# Patient Record
Sex: MALE | Race: WHITE
[De-identification: names, ages, dates, MRNs, and addresses within clinical notes are randomized per-mention and may not be internally consistent; named-entity substitution may affect disease eponyms.]

---

## 2017-05-22 NOTE — PCM.HP
H&P History of Present Illness





- General


Date of Service: 05/22/17


Admit Problem/Dx: 


 Admission Diagnosis/Problem





Admission Diagnosis/Problem      Afib, Atrial fibrillation








Source of Information: Patient, Provider


History Limitations: Reports: No Limitations





- History of Present Illness


Initial Comments - Free Text/Narative: 





Caden presents to the emergency room today with proximally 2 weeks of 

progressive fatigue, shortness of breath with exertion and fevers at home.  He 

reports onset of symptoms on May 6 with initial complaints including sinus 

congestion and sinus drainage.  Over the next couple of days this improved but 

his energy level dropped significantly.  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 

as a farmer but had significant fatigue throughout the day and was exhausted by 

the end of today.  He reports some intermittent "twinges" in his left chest but 

does not say that he has pain.  He thought maybe he had just pushed too hard 

with his farm tools.  He reports moderate achy pain in his thighs for the past 

few days but thinks this was because he overdid it using the skin steer.  He 

has had fevers including drenching sweats at home over the past few days.  He 

did have a tick bite approximately 2 weeks ago.  He does not recall having much 

attention to the take to see if it was a deer tick or not.  Appetite has been 

normal.  No diarrhea but he has had some constipation.





Workup in the emergency room remarkable for atrial fibrillation with rapid 

ventricular response and heart rates in the 150-190 range.  These are improving 

with a diltiazem infusion.  He has acute kidney injury, elevated AST and ALT 

and leukocytosis in addition to a CK level of nearly 3500.  There is concern 

for tickborne illness leading to the above abnormal labs and symptoms.  With 

his atrial fibrillation he will be admitted for management.





- Related Data


Allergies/Adverse Reactions: 


 Allergies











Allergy/AdvReac Type Severity Reaction Status Date / Time


 


No Known Allergies Allergy   Verified 05/22/17 14:19











Home Medications: 


 Home Meds





NK [No Known Home Meds]  05/22/17 [History]











Past Medical History


Respiratory History: Reports: Bronchitis, Recurrent





- Infectious Disease History


Infectious Disease History: Reports: Chicken Pox, Measles, Mumps





Social & Family History





- Family History


Cardiac: Reports: Afib, Arrhythmia, MI





- Tobacco Use


Smoking Status *Q: Former Smoker


Used Tobacco, but Quit: Yes


Month Tobacco Last Used: 1


Second Hand Smoke Exposure: No





- Caffeine Use


Caffeine Use: Reports: Coffee, Soda





- Alcohol Use


Alcohol Use History: Yes


Days Per Week of Alcohol Use: 1


Number of Drinks Per Day: 1


Total Drinks Per Week: 1





- Recreational Drug Use


Recreational Drug Use: No





H&P Review of Systems





- Review of Systems:


Review Of Systems: See Below


Free Text/Narrative: 





A complete 12 point review of systems was obtained.  Pertinent positives and 

negatives are noted in the history of present illness.  All other systems were 

reviewed and were negative except as noted.





Exam





- Exam


Exam: See Below





- Vital Signs


Vital Signs: 


 Last Vital Signs











Temp  36.4 C   05/22/17 16:46


 


Pulse  111 H  05/22/17 16:46


 


Resp  18   05/22/17 16:46


 


BP  130/77   05/22/17 16:46


 


Pulse Ox  91 L  05/22/17 16:46











Weight: 79.379 kg





- Exam


Quality Assessment: No: Supplemental Oxygen, Urinary Catheter


General: Alert, Oriented, Cooperative.  No: Mild Distress


HEENT: Conjunctiva Clear, Mucosa Moist & Pink.  No: Scleral Icterus


Neck: Supple, Trachea Midline.  No: Lymphadenopathy, Thyromegaly


Lungs: Normal Respiratory Effort, Rales (few right lung base)


Cardiovascular: Irregular Rhythm, Tachycardia.  No: Systolic Murmur


Abdomen: Normal Bowel Sounds, Soft.  No: Distention, Tenderness


Back Exam: Normal Inspection, Full Range of Motion


Extremities: Normal Inspection, Normal Pulses, Edema (very mild ankle edema 

bilaterally )


Skin: Warm, Dry, Intact


Neuro Extensive - Mental Status: Alert, Oriented x3


Neuro Extensive - Motor, Sensory, Reflexes: CN II-XII Intact.  No: Dysarthria, 

Abnormal Motor, Tremor


Psychiatric: Alert, Normal Affect





- Patient Data


Lab Results last 24 hrs: 


 Laboratory Results - last 24 hr











  05/22/17 05/22/17 05/22/17 Range/Units





  14:21 14:21 14:21 


 


WBC  12.1 H    (4.5-11.0)  K/uL


 


RBC  4.78    (4.30-5.90)  M/uL


 


Hgb  14.9    (12.0-15.0)  g/dL


 


Hct  43.4    (40.0-54.0)  %


 


MCV  91    (80-98)  fL


 


MCH  31    (27-31)  pg


 


MCHC  34    (32-36)  %


 


Plt Count  182    (150-400)  K/uL


 


Neut % (Auto)  69 H    (36-66)  %


 


Lymph % (Auto)  21 L    (24-44)  %


 


Mono % (Auto)  9 H    (2-6)  %


 


Eos % (Auto)  0 L    (2-4)  %


 


Baso % (Auto)  0    (0-1)  %


 


Sodium   141   (140-148)  mmol/L


 


Potassium   4.0   (3.6-5.2)  mmol/L


 


Chloride   104   (100-108)  mmol/L


 


Carbon Dioxide   24   (21-32)  mmol/L


 


Anion Gap   12.8   (5.0-14.0)  mmol/L


 


BUN   29 H   (7-18)  mg/dL


 


Creatinine   1.5 H   (0.8-1.3)  mg/dL


 


Est Cr Clr Drug Dosing   53.86   mL/min


 


Estimated GFR (MDRD)   49 L   (>60)  


 


Glucose   130 H   ()  mg/dL


 


Calcium   8.3 L   (8.5-10.1)  mg/dL


 


Magnesium     (1.8-2.4)  mg/dL


 


Total Bilirubin   0.7   (0.2-1.0)  mg/dL


 


AST   137 H   (15-37)  U/L


 


ALT   159 H   (12-78)  U/L


 


Alkaline Phosphatase   76   ()  U/L


 


Creatine Kinase   3851 H   ()  U/L


 


CK-MB (CK-2)     (0-3.6)  mg/mL


 


Troponin I    < 0.017  (0.000-0.056)  ng/mL


 


C-Reactive Protein     (0.0-0.3)  mg/dL


 


Total Protein   6.4   (6.4-8.2)  g/dL


 


Albumin   3.7   (3.4-5.0)  g/dL


 


Globulin   2.7   (2.3-3.5)  g/dL


 


Albumin/Globulin Ratio   1.4   (1.2-2.2)  














  05/22/17 05/22/17 05/22/17 Range/Units





  14:21 14:21 15:51 


 


WBC     (4.5-11.0)  K/uL


 


RBC     (4.30-5.90)  M/uL


 


Hgb     (12.0-15.0)  g/dL


 


Hct     (40.0-54.0)  %


 


MCV     (80-98)  fL


 


MCH     (27-31)  pg


 


MCHC     (32-36)  %


 


Plt Count     (150-400)  K/uL


 


Neut % (Auto)     (36-66)  %


 


Lymph % (Auto)     (24-44)  %


 


Mono % (Auto)     (2-6)  %


 


Eos % (Auto)     (2-4)  %


 


Baso % (Auto)     (0-1)  %


 


Sodium     (140-148)  mmol/L


 


Potassium     (3.6-5.2)  mmol/L


 


Chloride     (100-108)  mmol/L


 


Carbon Dioxide     (21-32)  mmol/L


 


Anion Gap     (5.0-14.0)  mmol/L


 


BUN     (7-18)  mg/dL


 


Creatinine     (0.8-1.3)  mg/dL


 


Est Cr Clr Drug Dosing     mL/min


 


Estimated GFR (MDRD)     (>60)  


 


Glucose     ()  mg/dL


 


Calcium     (8.5-10.1)  mg/dL


 


Magnesium  2.1    (1.8-2.4)  mg/dL


 


Total Bilirubin     (0.2-1.0)  mg/dL


 


AST     (15-37)  U/L


 


ALT     (12-78)  U/L


 


Alkaline Phosphatase     ()  U/L


 


Creatine Kinase     ()  U/L


 


CK-MB (CK-2)    97.4 H*  (0-3.6)  mg/mL


 


Troponin I     (0.000-0.056)  ng/mL


 


C-Reactive Protein   0.85 H   (0.0-0.3)  mg/dL


 


Total Protein     (6.4-8.2)  g/dL


 


Albumin     (3.4-5.0)  g/dL


 


Globulin     (2.3-3.5)  g/dL


 


Albumin/Globulin Ratio     (1.2-2.2)  











Result Diagrams: 


 05/22/17 14:21





 05/22/17 14:21





EKG INTERPRETATION


EKG Date: 05/22/17


Rhythm: a-fib


Rate (beats/min): 170


Axis: normal


P-wave: variable


QRS: normal


ST-T: normal


QT: normal


Comparison: NA - no prior EKG





*Q Meaningful Use (ADM)





- VTE *Q


VTE Criteria *Q: 








- VTE Risk Assess *Q


Each Risk Factor Represents 1 Point: Age 41 - 59 years


Total Score 1 Point Risk Factors: 1





- Stroke *Q


Stroke Criteria *Q: 








- AMI *Q


AMI Criteria *Q: 








- Problem List


(1) Atrial fibrillation with rapid ventricular response


SNOMED Code(s): 960973377416867


   ICD Code: I48.91 - UNSPECIFIED ATRIAL FIBRILLATION   Status: Acute   Current 

Visit: Yes   





(2) Acute kidney injury


SNOMED Code(s): 24650171


   ICD Code: N17.9 - ACUTE KIDNEY FAILURE, UNSPECIFIED   Status: Acute   

Current Visit: Yes   





(3) Anaplasmosis


SNOMED Code(s): 991898944


   ICD Code: A77.49 - OTHER EHRLICHIOSIS   Status: Acute   Current Visit: Yes   


Problem List Initiated/Reviewed/Updated: Yes


Orders Last 24hrs: 


 Active Orders 24 hr











 Category Date Time Status


 


 EKG Documentation Completion [RC] ASDIRECTED Care  05/22/17 14:07 Inactive


 


 BABESIA MICROTI IGG AND IGM [REF] Stat Lab  05/22/17 14:21 Received


 


 EHRLICHIA CHAFFEENSIS, IGG&IGM [REF] Stat Lab  05/22/17 14:21 Received


 


 LYME AB SCREEN RFLX [REF] Stat Lab  05/22/17 14:21 Received


 


 TSH ULTRASENSITIVE [CHEM] Routine Lab  05/22/17 16:40 Ordered


 


 UA W/MICROSCOPIC [URIN] Urgent Lab  05/22/17 14:06 Uncollected


 


 Diltiazem [Cardizem] 100 mg Med  05/22/17 14:30 Active





 Sodium Chloride 0.9% [Normal Saline] 100 ml   





 IV TITRATE   


 


 Sodium Chloride 0.9% [Normal Saline] 1,000 ml Med  05/22/17 14:15 Active





 IV ASDIRECTED   


 


 Resuscitation Status Routine Resus Stat  05/22/17 16:41 Ordered








 Medication Orders





Sodium Chloride (Normal Saline)  1,000 mls @ 500 mls/hr IV ASDIRECTED CHRIS


   Last Admin: 05/22/17 14:26  Dose: 500 mls/hr


Diltiazem HCl 100 mg/ Sodium (Chloride)  100 mls @ 5 mls/hr IV TITRATE CHRIS; 5 MG

/HR


   PRN Reason: Protocol


   Last Titration: 05/22/17 14:58  Dose: 15 mg/hr, 15 mls/hr


   Admin: 05/22/17 14:30  Dose: 5 mg/hr, 5 mls/hr








Assessment/Plan Comment:: 





Assessment and plan - 





Atrial fibrillation with rapid ventricular response - no history of heart 

disease.  Suspect inflammatory cause and possibly myocarditis with probable 

tickborne illness.  CK level elevated but troponin level normal.  Patient is 

not symptomatic with the atrial fibrillation side not sure how long he has been 

in the rhythm.  Cardioversion contraindicated because of this.  His 

electrolytes are acceptable.  Thyroid studies are pending.  He seems to be 

improving with the diltiazem infusion.  Hopefully treating the infection we'll 

provide significant benefit for the heart is well.


-Continue diltiazem infusion


-Followup thyroid studies


-Consider low-dose beta blocker versus potentially amiodarone if diltiazem isn'

t effective


-Treat infection as below





Acute kidney injury - probably secondary to acute infection and for hydration.


-IV fluids


-Labs in the morning





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  Tick stuck 

at least 36 hours and possibly more than 48 hours.  Laboratory studies but 

other than the elevated white blood cell count.  He has received doxycycline in 

the emergency room.  Is not currently febrile.  Other obvious source of 

infection.


-Doxycycline


-Followup tick studies which are send out labs


-Repeat labs in the morning





Maintenance issues - 


- DVT prophylaxis - SCDs


- GI prophylaxis - not indicated


- Nutrition - regular diet


- Schafer catheter - not indicated





CODE STATUS - full code





Admission justification - patient will be referred observation status for 

management of paroxysmal atrial fibrillation with rapid ventricular response





Disposition - anticipate discharge home tomorrow





Primary care physician - none currently





Horacio Clayton M.D.

## 2017-05-22 NOTE — EDM.PDOC
ED HPI GENERAL MEDICAL PROBLEM





- General


Chief Complaint: Chest Pain


Stated Complaint: FROM CLINIC CHEST PAIN


Time Seen by Provider: 05/22/17 15:14


Source of Information: Reports: Patient, Family


History Limitations: Reports: No Limitations





- History of Present Illness


INITIAL COMMENTS - FREE TEXT/NARRATIVE: 





Pt went to the walk in clinic today and was found to have a heart rate of 190. 

He has had slight chest  pressure. He has very poor exercise tolerance. He did 

have a tick bite several days ago and he thinks it could have been on about 2 

days. He has been having fevers. he had marked chilling .  He did not check his 

temp. 


Onset: Gradual


Duration: Day(s):, Other (pt has been very weak anf tired. )


Location: Reports: Chest, Other (pt has chest pressure. )


Associated Symptoms: Reports: Diaphoresis, Fever/Chills, Weakness





- Related Data


 Allergies











Allergy/AdvReac Type Severity Reaction Status Date / Time


 


No Known Allergies Allergy   Verified 05/22/17 14:19











Home Meds: 


 Home Meds





NK [No Known Home Meds]  05/22/17 [History]











Past Medical History


Respiratory History: Reports: Bronchitis, Recurrent





- Infectious Disease History


Infectious Disease History: Reports: Chicken Pox, Measles, Mumps





Social & Family History





- Family History


Cardiac: Reports: Afib, Arrhythmia, MI





- Tobacco Use


Smoking Status *Q: Former Smoker


Used Tobacco, but Quit: Yes


Month Tobacco Last Used: 1


Second Hand Smoke Exposure: No





- Caffeine Use


Caffeine Use: Reports: Coffee, Soda





- Recreational Drug Use


Recreational Drug Use: No





ED ROS GENERAL





- Review of Systems


Review Of Systems: See Below


Constitutional: Reports: Fever, Chills, Other (pt has shaking chills when he 

gets out of the shower)


HEENT: Reports: No Symptoms


Respiratory: Reports: No Symptoms


Cardiovascular: Reports: Palpitations, Other ( chest pressure)


Endocrine: Reports: Fatigue


GI/Abdominal: Reports: No Symptoms


: Reports: No Symptoms


Musculoskeletal: Reports: No Symptoms


Skin: Reports: No Symptoms


Neurological: Reports: No Symptoms


Psychiatric: Reports: Anxiety





ED EXAM, GENERAL





- Physical Exam


Exam: See Below


Free Text/Narrative:: 





pt arrived with a heart rate of 180 in atrial fib.  He hs been very fatiqued 

and not feeling well


Exam Limited By: No Limitations


General Appearance: Alert, Anxious, Mild Distress


Ears: Normal TMs


Nose: Normal Inspection


Throat/Mouth: Normal Inspection


Head: Atraumatic


Neck: Normal Inspection


Respiratory/Chest: No Respiratory Distress


Cardiovascular: Tachycardia, Irregularly Irregular, Other ( rate of 180)


GI/Abdominal: Soft, Non-Tender


 (Male) Exam: Deferred


Rectal (Males) Exam: Deferred


Extremities: Normal Inspection


Neurological: Alert, Oriented, Normal Cognition


Psychiatric: Anxious





Course





- Vital Signs


Last Recorded V/S: 


 Last Vital Signs











Temp  36.8 C   05/22/17 14:42


 


Pulse  148 H  05/22/17 15:23


 


Resp  16   05/22/17 15:23


 


BP  164/51 H  05/22/17 15:23


 


Pulse Ox  95   05/22/17 15:23














- Orders/Labs/Meds


Orders: 


 Active Orders 24 hr











 Category Date Time Status


 


 EKG Documentation Completion [RC] ASDIRECTED Care  05/22/17 14:07 Inactive


 


 BABESIA MICROTI IGG AND IGM [REF] Stat Lab  05/22/17 14:21 Received


 


 CKMB [CHEM] Stat Lab  05/22/17 15:48 Ordered


 


 EHRLICHIA CHAFFEENSIS, IGG&IGM [REF] Stat Lab  05/22/17 14:21 Received


 


 LYME AB SCREEN RFLX [REF] Stat Lab  05/22/17 14:21 Received


 


 UA W/MICROSCOPIC [URIN] Urgent Lab  05/22/17 14:06 Uncollected


 


 Diltiazem [Cardizem] 100 mg Med  05/22/17 14:30 Active





 Sodium Chloride 0.9% [Normal Saline] 100 ml   





 IV TITRATE   


 


 Doxycycline [Vibramycin] 100 mg Med  05/22/17 15:04 Active





 Sodium Chloride 0.9% [Normal Saline] 100 ml   





 IV ONETIME   


 


 Sodium Chloride 0.9% [Normal Saline] 1,000 ml Med  05/22/17 14:15 Active





 IV ASDIRECTED   








 Medication Orders





Sodium Chloride (Normal Saline)  1,000 mls @ 500 mls/hr IV ASDIRECTED CHRIS


   Last Admin: 05/22/17 14:26  Dose: 500 mls/hr


Diltiazem HCl 100 mg/ Sodium (Chloride)  100 mls @ 5 mls/hr IV TITRATE CHRIS; 5 MG

/HR


   PRN Reason: Protocol


   Last Titration: 05/22/17 14:58  Dose: 15 mg/hr, 15 mls/hr


   Admin: 05/22/17 14:30  Dose: 5 mg/hr, 5 mls/hr


Doxycycline Hyclate 100 mg/ (Sodium Chloride)  100 mls @ 100 mls/hr IV ONETIME 

ONE


   Stop: 05/22/17 16:03


   Last Admin: 05/22/17 15:30  Dose: 100 mls/hr


   Admin: 05/22/17 15:30  Dose: 100 mls/hr








Labs: 


 Laboratory Tests











  05/22/17 05/22/17 05/22/17 Range/Units





  14:21 14:21 14:21 


 


WBC  12.1 H    (4.5-11.0)  K/uL


 


RBC  4.78    (4.30-5.90)  M/uL


 


Hgb  14.9    (12.0-15.0)  g/dL


 


Hct  43.4    (40.0-54.0)  %


 


MCV  91    (80-98)  fL


 


MCH  31    (27-31)  pg


 


MCHC  34    (32-36)  %


 


Plt Count  182    (150-400)  K/uL


 


Neut % (Auto)  69 H    (36-66)  %


 


Lymph % (Auto)  21 L    (24-44)  %


 


Mono % (Auto)  9 H    (2-6)  %


 


Eos % (Auto)  0 L    (2-4)  %


 


Baso % (Auto)  0    (0-1)  %


 


Sodium   141   (140-148)  mmol/L


 


Potassium   4.0   (3.6-5.2)  mmol/L


 


Chloride   104   (100-108)  mmol/L


 


Carbon Dioxide   24   (21-32)  mmol/L


 


Anion Gap   12.8   (5.0-14.0)  mmol/L


 


BUN   29 H   (7-18)  mg/dL


 


Creatinine   1.5 H   (0.8-1.3)  mg/dL


 


Est Cr Clr Drug Dosing   53.86   mL/min


 


Estimated GFR (MDRD)   49 L   (>60)  


 


Glucose   130 H   ()  mg/dL


 


Calcium   8.3 L   (8.5-10.1)  mg/dL


 


Magnesium     (1.8-2.4)  mg/dL


 


Total Bilirubin   0.7   (0.2-1.0)  mg/dL


 


AST   137 H   (15-37)  U/L


 


ALT   159 H   (12-78)  U/L


 


Alkaline Phosphatase   76   ()  U/L


 


Creatine Kinase   3851 H   ()  U/L


 


Troponin I    < 0.017  (0.000-0.056)  ng/mL


 


C-Reactive Protein     (0.0-0.3)  mg/dL


 


Total Protein   6.4   (6.4-8.2)  g/dL


 


Albumin   3.7   (3.4-5.0)  g/dL


 


Globulin   2.7   (2.3-3.5)  g/dL


 


Albumin/Globulin Ratio   1.4   (1.2-2.2)  














  05/22/17 05/22/17 Range/Units





  14:21 14:21 


 


WBC    (4.5-11.0)  K/uL


 


RBC    (4.30-5.90)  M/uL


 


Hgb    (12.0-15.0)  g/dL


 


Hct    (40.0-54.0)  %


 


MCV    (80-98)  fL


 


MCH    (27-31)  pg


 


MCHC    (32-36)  %


 


Plt Count    (150-400)  K/uL


 


Neut % (Auto)    (36-66)  %


 


Lymph % (Auto)    (24-44)  %


 


Mono % (Auto)    (2-6)  %


 


Eos % (Auto)    (2-4)  %


 


Baso % (Auto)    (0-1)  %


 


Sodium    (140-148)  mmol/L


 


Potassium    (3.6-5.2)  mmol/L


 


Chloride    (100-108)  mmol/L


 


Carbon Dioxide    (21-32)  mmol/L


 


Anion Gap    (5.0-14.0)  mmol/L


 


BUN    (7-18)  mg/dL


 


Creatinine    (0.8-1.3)  mg/dL


 


Est Cr Clr Drug Dosing    mL/min


 


Estimated GFR (MDRD)    (>60)  


 


Glucose    ()  mg/dL


 


Calcium    (8.5-10.1)  mg/dL


 


Magnesium  2.1   (1.8-2.4)  mg/dL


 


Total Bilirubin    (0.2-1.0)  mg/dL


 


AST    (15-37)  U/L


 


ALT    (12-78)  U/L


 


Alkaline Phosphatase    ()  U/L


 


Creatine Kinase    ()  U/L


 


Troponin I    (0.000-0.056)  ng/mL


 


C-Reactive Protein   0.85 H  (0.0-0.3)  mg/dL


 


Total Protein    (6.4-8.2)  g/dL


 


Albumin    (3.4-5.0)  g/dL


 


Globulin    (2.3-3.5)  g/dL


 


Albumin/Globulin Ratio    (1.2-2.2)  











Meds: 


Medications











Generic Name Dose Route Start Last Admin





  Trade Name Freq  PRN Reason Stop Dose Admin


 


Sodium Chloride  1,000 mls @ 500 mls/hr  05/22/17 14:15  05/22/17 14:26





  Normal Saline  IV   500 mls/hr





  ASDIRECTED CHRIS   Administration


 


Diltiazem HCl 100 mg/ Sodium  100 mls @ 5 mls/hr  05/22/17 14:30  05/22/17 14:58





  Chloride  IV   15 mg/hr





  TITRATE CHRIS   15 mls/hr





  Protocol   Titration





  5 MG/HR   


 


Doxycycline Hyclate 100 mg/  100 mls @ 100 mls/hr  05/22/17 15:04  05/22/17 15:

30





  Sodium Chloride  IV  05/22/17 16:03  100 mls/hr





  ONETIME ONE   Administration














Discontinued Medications














Generic Name Dose Route Start Last Admin





  Trade Name Freq  PRN Reason Stop Dose Admin


 


Diltiazem HCl  10 mg  05/22/17 14:15  05/22/17 14:26





  Diltiazem  IVPUSH  05/22/17 14:16  10 mg





  ONETIME ONE   Administration














- Re-Assessments/Exams


Free Text/Narrative Re-Assessment/Exam: 





05/22/17 15:49


pt has a markedly elevted cpk. The trop is normal.His creatnine is 1.5. He has 

borderline platlets and his liver enzymes are up. He did have a tick bite 

several days ago. His heart rate was 190 on arrival. His chest xray looks ok. 

His ekg shows atrial fib. 





Departure





- Departure


Time of Disposition: 15:52


Disposition: Admitted As Inpatient 66


Condition: fair


Clinical Impression: 


 Anaplasmosis, Atrial fibrillation, Myositis





Forms:  ED Department Discharge


Care Plan Goals: 


 admit to  Dr garcia. 





- My Orders


Last 24 Hours: 


My Active Orders





05/22/17 14:06


UA W/MICROSCOPIC [URIN] Urgent 





05/22/17 14:07


EKG Documentation Completion [RC] ASDIRECTED 





05/22/17 14:15


Sodium Chloride 0.9% [Normal Saline] 1,000 ml IV ASDIRECTED 





05/22/17 14:21


BABESIA MICROTI IGG AND IGM [REF] Stat 


EHRLICHIA CHAFFEENSIS, IGG&IGM [REF] Stat 


LYME AB SCREEN RFLX [REF] Stat 





05/22/17 14:30


Diltiazem [Cardizem] 100 mg   Sodium Chloride 0.9% [Normal Saline] 100 ml IV 

TITRATE 





05/22/17 15:04


Doxycycline [Vibramycin] 100 mg   Sodium Chloride 0.9% [Normal Saline] 100 ml 

IV ONETIME 





05/22/17 15:48


CKMB [CHEM] Stat 














- Assessment/Plan


Last 24 Hours: 


My Active Orders





05/22/17 14:06


UA W/MICROSCOPIC [URIN] Urgent 





05/22/17 14:07


EKG Documentation Completion [RC] ASDIRECTED 





05/22/17 14:15


Sodium Chloride 0.9% [Normal Saline] 1,000 ml IV ASDIRECTED 





05/22/17 14:21


BABESIA MICROTI IGG AND IGM [REF] Stat 


EHRLICHIA CHAFFEENSIS, IGG&IGM [REF] Stat 


LYME AB SCREEN RFLX [REF] Stat 





05/22/17 14:30


Diltiazem [Cardizem] 100 mg   Sodium Chloride 0.9% [Normal Saline] 100 ml IV 

TITRATE 





05/22/17 15:04


Doxycycline [Vibramycin] 100 mg   Sodium Chloride 0.9% [Normal Saline] 100 ml 

IV ONETIME 





05/22/17 15:48


CKMB [CHEM] Stat

## 2017-05-23 NOTE — PCM.DCSUM1
**Discharge Summary





- Hospital Course


Brief History: 52-year-old male with history of tobacco dependence who presents 

with fevers and fatigue.  He is admitted for management of atrial fibrillation 

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





- Discharge Data


Discharge Date: 05/23/17


Discharge Disposition: Home, Self-Care 01


Condition: Fair





- Discharge Diagnosis/Problem(s)


(1) Atrial fibrillation with rapid ventricular response


SNOMED Code(s): 731627663553374


   ICD Code: I48.91 - UNSPECIFIED ATRIAL FIBRILLATION   Status: Acute   





(2) Acute kidney injury


SNOMED Code(s): 00798073


   ICD Code: N17.9 - ACUTE KIDNEY FAILURE, UNSPECIFIED   Status: Acute   





(3) Anaplasmosis


SNOMED Code(s): 296614159


   ICD Code: A77.49 - OTHER EHRLICHIOSIS   Status: Acute   





- Patient Summary/Data


Hospital Course: 





Caden presented to the emergency room with fevers and fatigue.  Workup in the 

emergency room revealed atrial fibrillation with rapid ventricular response and 

probable anaplasmosis.  He was admitted to the intensive care unit on a 

diltiazem infusion.  There is no evidence for myocardial ischemia but he did 

have an elevated CPK which was thought secondary to myositis and possibly 

myocarditis from the anaplasmosis.  We are able to achieve good rate control 

using the diltiazem infusion but he did remain in atrial fibrillation 

overnight.  His CK level has improved with hydration as has the mild elevation 

of AST and ALT.  Has not had any fevers overnight and clinically feels better 

the morning after admission.  He is requesting discharge home this morning and 

does not feel that he can stay longer because he has a farm to take care of.  

We have achieved good rate control so I believe he is safe for hospital 

discharge and outpatient management at this point.  I suspect that the atrial 

fibrillation is caused by the suspected infection and should resolve with 

treatment for the infection.  His CHADSS score is 0 at this time but I did 

recommend that he take a baby aspirin.  For the suspected anaplasmosis she 

received 20 additional days of antibiotic therapy.  Titers for tickborne 

disease have been sent but even if these return negative I think he would 

benefit from a full course of treatment because of the known tic bite with 

attachment greater than 24 hours and possibly greater than 48 hours as well as 

fairly classic symptoms and laboratory studies that fit fairly well.  He will 

be discharged to home on the doxycycline as well as a long-acting version of 

diltiazem and a baby aspirin.  He was encouraged to followup early next week to 

ensure that he continues to improve.





- Patient Instructions


Diet: Regular Diet as Tolerated


Activity: As Tolerated


Driving: May Drive Today


Showering/Bathing: May Shower


Notify Provider of: Fever, Increased Pain, Nausea and/or Vomiting


Other/Special Instructions: 1. You were in the hospital for management of 

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

tick borne disease).  Your heart rate has improved significantly with the use 

of diltiazem but you have not gone back to a normal rhythm as of yet.  I 

recommend that we continue the diltiazem after hospital discharge to help keep 

your heart rate at a normal level.  This should just be a temporary medication.

  You should take it once daily in the morning.  Your first dose is due 

tomorrow morning because you had a dose before you left the hospital.  I would 

recommend that you take a baby aspirin (81 mg) once daily for the next month.  

This will help reduce your risk of forming a blood clot in the upper chambers 

of your heart.  2. Anaplasmosis is an infection caused by a bite from a deer 

tick.  Treatment for this consists of taking doxycycline 100 mg twice daily for 

a total of 21 days.  You should take this medication with food.  Your first 

dose is due tonight at supper time.  This medication can make your skin more 

sensitive to the sun so you should take extra steps to avoid excessive sun 

exposure.  3. You can maintain activity as tolerated listened to your body and 

if you are becoming fatigued or short of breath please take a break.  4. Please 

followup next week to recheck your heart and see how you are feeling after the 

antibiotics were initiated.  5. Please seek medical attention if you develop 

fever greater than 101, have shortness of breath impairs your activities of 

daily living, you feel dizzy or lightheaded to the point that you think you're 

going to pass out or you do not continue to feel better with the current 

treatment plan.





- Discharge Plan


Prescriptions/Med Rec: 


Diltiazem HCl [Diltiazem 24Hr Cd] 360 mg PO DAILY #30 cap.er.24h


Doxycycline Calcium [IMW: Doxycycline] 100 mg PO Q12H #40 capsule


LORazepam [Ativan] 1 mg PO BEDTIME PRN #15 tablet


 PRN Reason: Sleep


Home Medications: 


 Home Meds





Diltiazem HCl [Diltiazem 24Hr Cd] 360 mg PO DAILY #30 cap.er.24h 05/23/17 [Rx]


Doxycycline Calcium [IMW: Doxycycline] 100 mg PO Q12H #40 capsule 05/23/17 [Rx]


LORazepam [Ativan] 1 mg PO BEDTIME PRN #15 tablet 05/23/17 [Rx]








Patient Handouts:  Doxycycline tablets or capsules, Atrial Fibrillation


Referrals: 


Satya Yates NP [Nurse Practitioner] -  (f/u next week - f/u hospital stay for 

afib and anaplasmosis)





- Discharge Summary/Plan Comment


DC Time >30 min.: No (25)





- Patient Data


Vitals - Most Recent: 


 Last Vital Signs











Temp  36.7 C   05/23/17 05:00


 


Pulse  96   05/23/17 05:00


 


Resp  22 H  05/23/17 05:00


 


BP  113/69   05/23/17 05:00


 


Pulse Ox  91 L  05/23/17 05:00











Weight - Most Recent: 79.379 kg


I&O - Last 24 hours: 


 Intake & Output











 05/22/17 05/22/17 05/23/17





 14:59 22:59 06:59


 


Intake Total  600 


 


Balance  600 











Lab Results - Last 24 hrs: 


 Laboratory Results - last 24 hr











  05/23/17 05/23/17 Range/Units





  05:10 05:10 


 


WBC  11.3 H   (4.5-11.0)  K/uL


 


RBC  4.43   (4.30-5.90)  M/uL


 


Hgb  13.7   (12.0-15.0)  g/dL


 


Hct  40.7   (40.0-54.0)  %


 


MCV  92   (80-98)  fL


 


MCH  31   (27-31)  pg


 


MCHC  34   (32-36)  %


 


Plt Count  154   (150-400)  K/uL


 


Sodium   139 L  (140-148)  mmol/L


 


Potassium   4.0  (3.6-5.2)  mmol/L


 


Chloride   107  (100-108)  mmol/L


 


Carbon Dioxide   20 L  (21-32)  mmol/L


 


Anion Gap   16.0 H  (5.0-14.0)  mmol/L


 


BUN   24 H  (7-18)  mg/dL


 


Creatinine   1.2  (0.8-1.3)  mg/dL


 


Est Cr Clr Drug Dosing   67.32  mL/min


 


Estimated GFR (MDRD)   > 60  (>60)  


 


Glucose   100  ()  mg/dL


 


Calcium   7.7 L  (8.5-10.1)  mg/dL


 


Total Bilirubin   0.6  (0.2-1.0)  mg/dL


 


AST   88 H  (15-37)  U/L


 


ALT   130 H  (12-78)  U/L


 


Alkaline Phosphatase   68  ()  U/L


 


Creatine Kinase   1877 H  ()  U/L


 


Total Protein   5.8 L  (6.4-8.2)  g/dL


 


Albumin   3.3 L  (3.4-5.0)  g/dL


 


Globulin   2.5  (2.3-3.5)  g/dL


 


Albumin/Globulin Ratio   1.3  (1.2-2.2)  











Med Orders - Current: 


 Current Medications





Acetaminophen (Tylenol)  650 mg PO Q4H PRN


   PRN Reason: Pain (Mild 1-3)/fever


Diltiazem HCl (Cardizem Cd)  360 mg PO DAILY CHRIS


Doxycycline Hyclate (Vibramycin)  100 mg PO Q12H CHRIS


   Last Admin: 05/23/17 03:46 Dose:  100 mg


Diltiazem HCl 100 mg/ Sodium (Chloride)  100 mls @ 5 mls/hr IV TITRATE CHRIS; 5 MG

/HR


   PRN Reason: Protocol


   Last Admin: 05/23/17 03:35 Dose:  15 mg/hr, 15 mls/hr


Sodium Chloride (Normal Saline)  1,000 mls @ 125 mls/hr IV ASDIRECTED CHRIS


   Last Admin: 05/23/17 03:37 Dose:  125 mls/hr


Ibuprofen (Motrin)  400 mg PO Q6H PRN


   PRN Reason: Pain (mild 1-3)


Lorazepam (Ativan)  1 mg PO Q4H PRN


   PRN Reason: Anxiety


   Last Admin: 05/22/17 23:26 Dose:  1 mg


Ondansetron HCl (Zofran Odt)  4 mg PO Q6H PRN


   PRN Reason: Nausea able to take PO


Polyethylene Glycol (Miralax)  17 gm PO DAILY PRN


   PRN Reason: Constipation





Discontinued Medications





Diltiazem HCl (Diltiazem)  10 mg IVPUSH ONETIME ONE


   Stop: 05/22/17 14:16


   Last Admin: 05/22/17 14:26 Dose:  10 mg


Sodium Chloride (Normal Saline)  1,000 mls @ 500 mls/hr IV ASDIRECTED CHRIS


   Last Admin: 05/22/17 14:26 Dose:  500 mls/hr


Diltiazem HCl 100 mg/ Sodium (Chloride)  100 mls @ 5 mls/hr IV TITRATE CHRIS; 5 MG

/HR


   PRN Reason: Protocol


   Last Titration: 05/22/17 14:58 Dose:  15 mg/hr, 15 mls/hr


Doxycycline Hyclate 100 mg/ (Sodium Chloride)  100 mls @ 100 mls/hr IV ONETIME 

ONE


   Stop: 05/22/17 16:03


   Last Admin: 05/22/17 15:30 Dose:  100 mls/hr











*Q Meaningful Use (DIS)





- VTE *Q


VTE Criteria *Q: 








- Stroke *Q


Stroke Criteria *Q: 








- AMI *Q


AMI Criteria *Q:

## 2017-05-30 NOTE — PCM.HP
H&P History of Present Illness





- General


Date of Service: 05/30/17


Admit Problem/Dx: 


 Admission Diagnosis/Problem





Admission Diagnosis/Problem      Atrial fibrillation








Source of Information: Patient, Old Records, Provider, RN Notes Reviewed


History Limitations: Reports: No Limitations





- History of Present Illness


Initial Comments - Free Text/Narative: 





This patient is a 52-year-old gentleman who is admitted as a direct admission 

from the clinic because of atrial fibrillation with rapid ventricular response 

as well as severe peripheral edema/anasarca.  He's had difficulty over the past 

2-3 weeks with progressive increase in shortness of breath and peripheral 

edema.  He was hospitalized at this facility just over a week ago with atrial 

fibrillation and rapid ventricular response.  He was started on rate slowing 

medication with Cardizem and at the time of discharge had good rate control.  

CHADs2 VASc SCORE was 0 so he was not started on anticoagulation other than 

aspirin 81 mg daily.  Since discharge he's gained significant weight as well as 

marked peripheral edema extending up into the lower abdomen and lower back.  

During this period of time has become progressively even more short of breath.  

He was seen and evaluated the clinic today because of elevated heart rate as 

well as shortness of breath and marked peripheral edema he was referred for 

direct admission to the hospital for further evaluation and management.  He 

denies significant symptoms of chest pain or pressure, he has had both PND and 

orthopnea but they seem to be somewhat improved over the past few days.





- Related Data


Allergies/Adverse Reactions: 


 Allergies











Allergy/AdvReac Type Severity Reaction Status Date / Time


 


No Known Allergies Allergy   Verified 05/22/17 14:19











Home Medications: 


 Home Meds





Diltiazem HCl [Diltiazem 24Hr Cd] 360 mg PO DAILY #30 cap.er.24h 05/23/17 [Rx]


Doxycycline Calcium [IMW: Doxycycline] 100 mg PO Q12H #40 capsule 05/23/17 [Rx]


LORazepam [Ativan] 1 mg PO BEDTIME PRN #15 tablet 05/23/17 [Rx]


Aspirin [Halfprin] 81 mg PO DAILY 05/30/17 [History]











Past Medical History


Cardiovascular History: Reports: Arrhythmia, Heart Failure, Other (See Below)


Other Cardiovascular History: a fib


Respiratory History: Reports: Bronchitis, Recurrent





- Infectious Disease History


Infectious Disease History: Reports: Chicken Pox, Measles, Mumps





Social & Family History





- Family History


Cardiac: Reports: Afib, Arrhythmia, MI





- Tobacco Use


Smoking Status *Q: Former Smoker


Used Tobacco, but Quit: Yes


Month Tobacco Last Used: 1


Second Hand Smoke Exposure: No





- Caffeine Use


Caffeine Use: Reports: Coffee, Soda





- Alcohol Use


Days Per Week of Alcohol Use: 1


Number of Drinks Per Day: 1


Total Drinks Per Week: 1





- Recreational Drug Use


Recreational Drug Use: No





H&P Review of Systems





- Review of Systems:


Review Of Systems: See Below


General: Denies: Fever, Chills, Weakness, Diaphoresis


HEENT: Reports: No Symptoms


Pulmonary: Reports: Shortness of Breath.  Denies: Wheezing, Pleuritic Chest Pain

, Cough, Sputum, Hemoptysis


Cardiovascular: Reports: Dyspnea on Exertion, Orthopnea, PND, Edema, 

Lightheadedness.  Denies: Chest Pain, Palpitations, Syncope


Gastrointestinal: Reports: No Symptoms


Genitourinary: Reports: No Symptoms


Musculoskeletal: Reports: No Symptoms


Skin: Reports: No Symptoms


Psychiatric: Reports: No Symptoms


Neurological: Reports: No Symptoms


Hematologic/Lymphatic: Reports: No Symptoms


Immunologic: Reports: No Symptoms





Exam





- Exam


Exam: See Below





- Vital Signs


Vital Signs: 


 Last Vital Signs











Temp  97.9 F   05/30/17 11:03


 


Pulse  90   05/30/17 15:41


 


Resp  19   05/30/17 15:41


 


BP  100/79   05/30/17 15:41


 


Pulse Ox  95   05/30/17 15:41











Weight: 190 lb 6.4 oz





- Exam


Quality Assessment: DVT Prophylaxis


General: Alert, Oriented, Cooperative, Mild Distress


HEENT: Conjunctiva Clear, EOMI, Hearing Intact, Mucosa Moist & Pink, Normal 

Nasal Septum, Posterior Pharynx Clear, Pupils Equal, Pupils Reactive


Neck: Supple, Trachea Midline, +2 Carotid Pulse wo Bruit


Lungs: Normal Respiratory Effort, Rales.  No: Decreased Breath Sounds, Crackles

, Rhonchi, Wheezing


Cardiovascular: Normal S1, Normal S2, Irregular Rhythm, Tachycardia, Systolic 

Murmur.  No: Diastolic Murmur


Abdomen: Normal Bowel Sounds, Soft


Back Exam: Normal Inspection, Full Range of Motion, NT


Extremities: Edema


Skin: Warm, Dry, Intact


Neurological: Cranial Nerves Intact, Strength Equal Bilateral, Normal Speech, 

Normal Tone, Sensation Intact.  No: Focal Deficit


Neuro Extensive - Mental Status: Alert, Oriented x3, Normal Mood/Affect, Normal 

Cognition, Memory Intact





- Patient Data


Lab Results last 24 hrs: 


 Laboratory Results - last 24 hr











  05/30/17 05/30/17 05/30/17 Range/Units





  11:10 11:10 11:10 


 


WBC  12.1 H    (4.5-11.0)  K/uL


 


RBC  4.79    (4.30-5.90)  M/uL


 


Hgb  14.9    (12.0-15.0)  g/dL


 


Hct  44.0    (40.0-54.0)  %


 


MCV  92    (80-98)  fL


 


MCH  31    (27-31)  pg


 


MCHC  34    (32-36)  %


 


Plt Count  177    (150-400)  K/uL


 


Neut % (Auto)  64    (36-66)  %


 


Lymph % (Auto)  25    (24-44)  %


 


Mono % (Auto)  10 H    (2-6)  %


 


Eos % (Auto)  1 L    (2-4)  %


 


Baso % (Auto)  1    (0-1)  %


 


Sodium   139 L   (140-148)  mmol/L


 


Potassium   4.1   (3.6-5.2)  mmol/L


 


Chloride   108   (100-108)  mmol/L


 


Carbon Dioxide   23   (21-32)  mmol/L


 


Anion Gap   12.1   (5.0-14.0)  mmol/L


 


BUN   30 H   (7-18)  mg/dL


 


Creatinine   1.3   (0.8-1.3)  mg/dL


 


Est Cr Clr Drug Dosing   62.15   mL/min


 


Estimated GFR (MDRD)   58 L   (>60)  


 


Glucose   109 H   ()  mg/dL


 


Calcium   8.0 L   (8.5-10.1)  mg/dL


 


Magnesium   1.9   (1.8-2.4)  mg/dL


 


Total Bilirubin   0.5   (0.2-1.0)  mg/dL


 


AST   56 H   (15-37)  U/L


 


ALT   103 H   (12-78)  U/L


 


Alkaline Phosphatase   81   ()  U/L


 


Creatine Kinase   503 H   ()  U/L


 


CK-MB (CK-2)   15.0 H*   (0-3.6)  mg/mL


 


Troponin I   0.017   (0.000-0.056)  ng/mL


 


Total Protein   5.9 L   (6.4-8.2)  g/dL


 


Albumin   3.1 L   (3.4-5.0)  g/dL


 


Globulin   2.8   (2.3-3.5)  g/dL


 


Albumin/Globulin Ratio   1.1 L   (1.2-2.2)  


 


TSH, Ultra Sensitive    0.837  (0.358-3.740)  uIU/mL











Result Diagrams: 


 05/30/17 11:10





 05/30/17 11:10





*Q Meaningful Use (ADM)





- VTE *Q


VTE Criteria *Q: 








- VTE Risk Assess *Q


Each Risk Factor Represents 1 Point: Age 41 - 59 years, Swollen Legs, Current, 

Congestive Heart Failure, Less than 1 Month


Total Score 1 Point Risk Factors: 3


Each Risk Factor Represents 2 Points: None


Total Score 2 Point Risk Factors: 0


Each Risk Factor Represents 3 Points: None


Total Score 3 Point Risk Factors: 0


Each Risk Factor Represents 5 Points: None


Total Score 5 Point Risk Factors: 0


Venous Thromboembolism Risk Factor Score *Q: 3





- Stroke *Q


Stroke Criteria *Q: 








- AMI *Q


AMI Criteria *Q: 





Problem List Initiated/Reviewed/Updated: Yes


Orders Last 24hrs: 


 Active Orders 24 hr











 Category Date Time Status


 


 Cardiac Monitoring [RC] .As Directed Care  05/30/17 10:42 Active


 


 Height and Weight [RC] 0511 Care  05/30/17 10:41 Active


 


 Intake and Output [RC] QSHIFT Care  05/30/17 10:42 Active


 


 Notify Provider Vital Signs [RC] ASDIRECTED Care  05/30/17 10:42 Active


 


 Oxygen Therapy [RC] PRN Care  05/30/17 10:41 Active


 


 Up ad Giulia [RC] ASDIRECTED Care  05/30/17 10:41 Active


 


 VTE/DVT Education [RC] Per Unit Routine Care  05/30/17 10:41 Active


 


 Vital Signs [RC] Q4H Care  05/30/17 10:41 Active


 


 Consult to Dietary [Consult to Dietician] [CONS] Cons  05/30/17 14:23 Active





 Routine   


 


 2 Gram Sodium Diet [DIET] Diet  05/30/17 Dinner Active


 


 Ang Chest [CT] Stat Exams  05/30/17 15:41 Ordered


 


 Echo Comp wo Cont [US] Urgent Exams  06/01/17 08:00 Ordered


 


 BASIC METABOLIC PANEL,BMP [CHEM] Timed Lab  05/31/17 05:00 Ordered


 


 CBC WITH AUTO DIFF [HEME] Timed Lab  05/31/17 05:00 Ordered


 


 CREATINE KINASE,CK [CHEM] Timed Lab  05/31/17 05:00 Ordered


 


 DIGOXIN [CHEM] Timed Lab  05/31/17 05:00 Ordered


 


 MAGNESIUM [CHEM] Timed Lab  05/31/17 05:00 Ordered


 


 Acetaminophen [Tylenol] Med  05/30/17 10:41 Active





 650 mg PO Q4H PRN   


 


 Aspirin Med  05/31/17 09:00 Active





 81 mg PO DAILY   


 


 Digoxin [Lanoxin] Med  05/30/17 16:28 Once





 250 mcg IVPUSH ONETIME ONE   


 


 Docusate Sodium [Colace] Med  05/30/17 10:41 Active





 100 mg PO BID PRN   


 


 Doxycycline [Vibramycin] Med  05/30/17 21:00 Active





 100 mg PO BID   


 


 Enoxaparin [Lovenox] Med  05/30/17 10:45 Active





 40 mg SUBCUT DAILY   


 


 Furosemide [Lasix] Med  05/30/17 19:00 Ordered





 20 mg IVPUSH Q12H   


 


 LORazepam [Ativan] Med  05/30/17 10:40 Active





 1 mg PO BEDTIME PRN   


 


 Magnesium Hydroxide [Milk of Magnesia] Med  05/30/17 10:41 Active





 30 ml PO Q12H PRN   


 


 Ondansetron [Zofran] Med  05/30/17 10:41 Active





 4 mg IV Q4H PRN   


 


 Polyethylene Glycol 3350 [MiraLAX] Med  05/30/17 10:41 Active





 17 gm PO DAILY PRN   


 


 Sodium Chloride 0.9% [Saline Flush] Med  05/30/17 10:41 Active





 10 ml FLUSH ASDIRECTED PRN   


 


 oxyCODONE Med  05/30/17 10:41 Active





 5 mg PO Q4H PRN   


 


 Saline Lock Insert [OM.PC] Routine Oth  05/30/17 10:41 Ordered


 


 Resuscitation Status Routine Resus Stat  05/30/17 10:41 Ordered


 


 EKG 12 Lead [EK] Stat Ther  05/30/17 10:41 Ordered








 Medication Orders





Acetaminophen (Tylenol)  650 mg PO Q4H PRN


   PRN Reason: Pain (Mild 1-3)/fever


Aspirin (Aspirin)  81 mg PO DAILY CHRIS


Docusate Sodium (Colace)  100 mg PO BID PRN


   PRN Reason: Constipation


Doxycycline Hyclate (Vibramycin)  100 mg PO BID CHRIS


Enoxaparin Sodium (Lovenox)  40 mg SUBCUT DAILY CHRIS


   Last Admin: 05/30/17 11:47  Dose: 40 mg


Lorazepam (Ativan)  1 mg PO BEDTIME PRN


   PRN Reason: Sleep


Magnesium Hydroxide (Milk Of Magnesia)  30 ml PO Q12H PRN


   PRN Reason: Constipation


Ondansetron HCl (Zofran)  4 mg IV Q4H PRN


   PRN Reason: Nausea/Vomiting


Oxycodone HCl (Oxycodone)  5 mg PO Q4H PRN


   PRN Reason: Pain (moderate 4-6)


Polyethylene Glycol (Miralax)  17 gm PO DAILY PRN


   PRN Reason: Constipation


Sodium Chloride (Saline Flush)  10 ml FLUSH ASDIRECTED PRN


   PRN Reason: Keep Vein Open








Assessment/Plan Comment:: 





ASSESSMENT AND PLAN





SHORTNESS OF BREATH ASSOCIATED WITH MARKED PERIPHERAL EDEMA-suspect underlying 

cardiac dysfunction, also has significant systolic murmur noted on physical 

examination.  Recent symptoms of increased shortness of breath, peripheral edema

, PND, orthopnea, associated with atrial fibrillation and rapid ventricular 

response.


-2 g sodium diet


-Lasix 20 mg IV every 12 hours


-CT scan with PE protocol


-Echocardiogram to assess left ventricular function and valvular status





ATRIAL FIBRILLATION WITH RAPID VENTRICULAR RESPONSE-unknown length of duration


-Hold oral Cardizem given borderline blood pressures


-Consider switch to beta blocker therapy


-Digoxin IV


-digoxin level in a.m.





ELEVATED CK-noted on previous admission marked elevation, CK-MB also found to 

be elevated but only in the range of 2-3% of total


-Repeat CT in a.m.


-We'll need to consider evaluation for skeletal muscle inflammation





MAINTENANCE ISSUES


-DVT prophylaxis; Lovenox 40 mg subcutaneous daily


-GI prophylaxis; not required


-Schafer catheter; not required


-Nutrition; 2 g sodium diet


-Nicotine dependence; not indicated





CODE STATUS-full code





ADMISSION STATUS-patient will be admitted to inpatient status, expect at least 

a 2 night hospital stay for evaluation and management of problems as outlined 

above.  At the time of this admission I do not reasonably expected evaluation 

and management of this problem will require more than a 96 hour hospital stay.





DISPOSITION-anticipate discharge to home after the hospital stay.





PRIMARY CARE PROVIDER-Dr. Aguilera

## 2017-05-31 NOTE — PCM.PN
- General Info


Date of Service: 05/31/17


Functional Status: Reports: tolerating diet, urinating





- Review of Systems


General: Denies: Fever, Weakness, Chills


Pulmonary: Denies: no symptoms


Cardiovascular: Reports: Palpitations, Dyspnea on Exertion, Edema.  Denies: 

Chest Pain, Orthopnea, PND


Gastrointestinal: Reports: No symptoms


Systems Review Comment:: 





This patient is a 52-year-old gentleman who is admitted yesterday with 

congestive heart failure, shortness of breath, peripheral edema, and atrial 

fibrillation with rapid ventricular response.  He has diuresed well since 

admission and notes improvement in his edema as well as shortness of breath.  

Blood pressure somewhat better than it had been yesterday, diltiazem has been 

held.  He's been started on digoxin which has helped rate control somewhat and 

we'll plan to start beta blocker therapy today.





- Patient Data


Vitals - most recent: 


 Last Vital Signs











Temp  97.9 F   05/31/17 08:00


 


Pulse  105 H  05/31/17 05:45


 


Resp  20   05/31/17 08:00


 


BP  109/61   05/31/17 08:00


 


Pulse Ox  95   05/31/17 08:00











Weight - most recent: 181 lb 6.4 oz


I&O - last 24 hours: 


 Intake & Output











 05/30/17 05/31/17 05/31/17





 22:59 06:59 14:59


 


Intake Total  240 


 


Output Total 300 1375 1600


 


Balance -300 -9638 -1600











Lab Results last 24 hrs: 


 Laboratory Results - last 24 hr











  05/30/17 05/30/17 05/30/17 Range/Units





  11:10 11:10 11:10 


 


WBC  12.1 H    (4.5-11.0)  K/uL


 


RBC  4.79    (4.30-5.90)  M/uL


 


Hgb  14.9    (12.0-15.0)  g/dL


 


Hct  44.0    (40.0-54.0)  %


 


MCV  92    (80-98)  fL


 


MCH  31    (27-31)  pg


 


MCHC  34    (32-36)  %


 


Plt Count  177    (150-400)  K/uL


 


Neut % (Auto)  64    (36-66)  %


 


Lymph % (Auto)  25    (24-44)  %


 


Mono % (Auto)  10 H    (2-6)  %


 


Eos % (Auto)  1 L    (2-4)  %


 


Baso % (Auto)  1    (0-1)  %


 


Sodium   139 L   (140-148)  mmol/L


 


Potassium   4.1   (3.6-5.2)  mmol/L


 


Chloride   108   (100-108)  mmol/L


 


Carbon Dioxide   23   (21-32)  mmol/L


 


Anion Gap   12.1   (5.0-14.0)  mmol/L


 


BUN   30 H   (7-18)  mg/dL


 


Creatinine   1.3   (0.8-1.3)  mg/dL


 


Est Cr Clr Drug Dosing   62.15   mL/min


 


Estimated GFR (MDRD)   58 L   (>60)  


 


Glucose   109 H   ()  mg/dL


 


Calcium   8.0 L   (8.5-10.1)  mg/dL


 


Magnesium   1.9   (1.8-2.4)  mg/dL


 


Total Bilirubin   0.5   (0.2-1.0)  mg/dL


 


AST   56 H   (15-37)  U/L


 


ALT   103 H   (12-78)  U/L


 


Alkaline Phosphatase   81   ()  U/L


 


Creatine Kinase   503 H   ()  U/L


 


CK-MB (CK-2)   15.0 H*   (0-3.6)  mg/mL


 


Troponin I   0.017   (0.000-0.056)  ng/mL


 


Total Protein   5.9 L   (6.4-8.2)  g/dL


 


Albumin   3.1 L   (3.4-5.0)  g/dL


 


Globulin   2.8   (2.3-3.5)  g/dL


 


Albumin/Globulin Ratio   1.1 L   (1.2-2.2)  


 


Triglycerides     ()  mg/dL


 


Cholesterol     (0-200)  mg/dL


 


LDL Cholesterol Direct     (0-100)  mg/dL


 


HDL Cholesterol     (40-60)  mg/dL


 


TSH, Ultra Sensitive    0.837  (0.358-3.740)  uIU/mL


 


Digoxin     (0.90-2.00)  ng/mL














  05/31/17 05/31/17 05/31/17 Range/Units





  05:50 05:50 05:50 


 


WBC  11.4 H    (4.5-11.0)  K/uL


 


RBC  4.62    (4.30-5.90)  M/uL


 


Hgb  14.5    (12.0-15.0)  g/dL


 


Hct  42.3    (40.0-54.0)  %


 


MCV  92    (80-98)  fL


 


MCH  31    (27-31)  pg


 


MCHC  34    (32-36)  %


 


Plt Count  171    (150-400)  K/uL


 


Neut % (Auto)  65    (36-66)  %


 


Lymph % (Auto)  23 L    (24-44)  %


 


Mono % (Auto)  10 H    (2-6)  %


 


Eos % (Auto)  2    (2-4)  %


 


Baso % (Auto)  0    (0-1)  %


 


Sodium   141   (140-148)  mmol/L


 


Potassium   3.9   (3.6-5.2)  mmol/L


 


Chloride   109 H   (100-108)  mmol/L


 


Carbon Dioxide   24   (21-32)  mmol/L


 


Anion Gap   11.9   (5.0-14.0)  mmol/L


 


BUN   26 H   (7-18)  mg/dL


 


Creatinine   1.2   (0.8-1.3)  mg/dL


 


Est Cr Clr Drug Dosing   67.32   mL/min


 


Estimated GFR (MDRD)   > 60   (>60)  


 


Glucose   91   ()  mg/dL


 


Calcium   7.9 L   (8.5-10.1)  mg/dL


 


Magnesium   1.7 L   (1.8-2.4)  mg/dL


 


Total Bilirubin     (0.2-1.0)  mg/dL


 


AST     (15-37)  U/L


 


ALT     (12-78)  U/L


 


Alkaline Phosphatase     ()  U/L


 


Creatine Kinase   270   ()  U/L


 


CK-MB (CK-2)     (0-3.6)  mg/mL


 


Troponin I     (0.000-0.056)  ng/mL


 


Total Protein     (6.4-8.2)  g/dL


 


Albumin     (3.4-5.0)  g/dL


 


Globulin     (2.3-3.5)  g/dL


 


Albumin/Globulin Ratio     (1.2-2.2)  


 


Triglycerides    66  ()  mg/dL


 


Cholesterol    108  (0-200)  mg/dL


 


LDL Cholesterol Direct    60  (0-100)  mg/dL


 


HDL Cholesterol    46  (40-60)  mg/dL


 


TSH, Ultra Sensitive     (0.358-3.740)  uIU/mL


 


Digoxin   0.40 L   (0.90-2.00)  ng/mL











Med Orders - Current: 


 Current Medications





Acetaminophen (Tylenol)  650 mg PO Q4H PRN


   PRN Reason: Pain (Mild 1-3)/fever


Aspirin (Aspirin)  81 mg PO DAILY Count includes the [facility]


   Last Admin: 05/31/17 08:00 Dose:  81 mg


Docusate Sodium (Colace)  100 mg PO BID PRN


   PRN Reason: Constipation


Doxycycline Hyclate (Vibramycin)  100 mg PO BID Count includes the [facility]


   Last Admin: 05/31/17 08:00 Dose:  100 mg


Enoxaparin Sodium (Lovenox)  40 mg SUBCUT DAILY Count includes the [facility]


   Last Admin: 05/31/17 08:00 Dose:  40 mg


Furosemide (Lasix)  20 mg IVPUSH Q12H Count includes the [facility]


   Last Admin: 05/31/17 06:09 Dose:  20 mg


Lorazepam (Ativan)  1 mg PO BEDTIME PRN


   PRN Reason: Sleep


   Last Admin: 05/31/17 00:06 Dose:  1 mg


Magnesium Hydroxide (Milk Of Magnesia)  30 ml PO Q12H PRN


   PRN Reason: Constipation


Magnesium Oxide (Magnesium Oxide)  400 mg PO BID Count includes the [facility]


Metoprolol Tartrate (Lopressor)  25 mg PO Q6H Count includes the [facility]


Ondansetron HCl (Zofran)  4 mg IV Q4H PRN


   PRN Reason: Nausea/Vomiting


Oxycodone HCl (Oxycodone)  5 mg PO Q4H PRN


   PRN Reason: Pain (moderate 4-6)


Polyethylene Glycol (Miralax)  17 gm PO DAILY PRN


   PRN Reason: Constipation


Sodium Chloride (Saline Flush)  10 ml FLUSH ASDIRECTED PRN


   PRN Reason: Keep Vein Open


Sodium Chloride (Saline Flush)  10 ml FLUSH ONETIME PRN


   PRN Reason: PER RADIOLOGY PROTOCOL


   Last Admin: 05/30/17 16:57 Dose:  10 ml





Discontinued Medications





Digoxin (Lanoxin)  250 mcg IVPUSH ONETIME ONE


   Stop: 05/30/17 11:20


   Last Admin: 05/30/17 11:42 Dose:  250 mcg


Digoxin (Lanoxin)  250 mcg IVPUSH ONETIME ONE


   Stop: 05/30/17 16:46


   Last Admin: 05/30/17 18:18 Dose:  250 mcg


Digoxin (Lanoxin)  250 mcg IVPUSH ONETIME ONE


   Stop: 05/31/17 08:31


Diltiazem HCl (Cardizem Cd)  360 mg PO DAILY Count includes the [facility]


Furosemide (Lasix)  20 mg IV ONETIME ONE


   Stop: 05/30/17 11:01


   Last Admin: 05/30/17 14:16 Dose:  20 mg


Sodium Chloride (Normal Saline)  100 mls @ 3 mls/sec IV ONETIME ONE


   Stop: 05/30/17 16:35


   Last Admin: 05/30/17 16:57 Dose:  3 mls/sec


Iopamidol (Isovue-370 (76%))  100 ml IV .AS DIRECTED [facility]


   Stop: 05/30/17 23:00


   Last Admin: 05/30/17 16:57 Dose:  100 ml


Magnesium Oxide (Magnesium Oxide)  800 mg PO ONETIME ONE


   Stop: 05/31/17 08:31


Metoprolol Tartrate (Lopressor)  25 mg PO Q6H [facility]


Potassium Chloride (Klor-Con M20)  40 meq PO ONETIME ONE


   Stop: 05/31/17 09:01











- Exam


Quality Assessment: DVT prophylaxis


General: alert, oriented, cooperative, no acute distress


Lungs: Clear to auscultation, Normal respiratory effort


Cardiovascular: Irregular Rhythm, Tachycardia, Murmurs.  No: Bradycardia, 

Gallops


Abdomen: bowel sounds present, soft, no tenderness, no distension


Extremities: edema


Skin: warm, dry, intact





- Problem List Review


Problem List Initiated/Reviewed/Updated: Yes





- My Orders


Last 24 Hours: 


My Active Orders





05/30/17 10:40


LORazepam [Ativan]   1 mg PO BEDTIME PRN 





05/30/17 10:41


Height and Weight [RC] 0511 


Oxygen Therapy [RC] PRN 


Up ad Giulia [RC] ASDIRECTED 


VTE/DVT Education [RC] Per Unit Routine 


Vital Signs [RC] Q2H 


Acetaminophen [Tylenol]   650 mg PO Q4H PRN 


Docusate Sodium [Colace]   100 mg PO BID PRN 


Magnesium Hydroxide [Milk of Magnesia]   30 ml PO Q12H PRN 


Ondansetron [Zofran]   4 mg IV Q4H PRN 


Polyethylene Glycol 3350 [MiraLAX]   17 gm PO DAILY PRN 


Sodium Chloride 0.9% [Saline Flush]   10 ml FLUSH ASDIRECTED PRN 


oxyCODONE   5 mg PO Q4H PRN 


Saline Lock Insert [OM.PC] Routine 


Resuscitation Status Routine 


EKG 12 Lead [EK] Stat 





05/30/17 10:42


Cardiac Monitoring [RC] Q6H 


Intake and Output [RC] QSHIFT 


Notify Provider Vital Signs [RC] ASDIRECTED 





05/30/17 10:45


Enoxaparin [Lovenox]   40 mg SUBCUT DAILY 





05/30/17 14:23


Consult to Dietary [Consult to Dietician] [CONS] Routine 





05/30/17 15:41


Ang Chest [CT] Stat 





05/30/17 16:34


Sodium Chloride 0.9% [Saline Flush]   10 ml FLUSH ONETIME PRN 





05/30/17 18:30


Furosemide [Lasix]   20 mg IVPUSH Q12H 





05/30/17 21:00


Doxycycline [Vibramycin]   100 mg PO BID 





05/30/17 Dinner


2 Gram Sodium Diet [DIET] 





05/31/17 09:00


Aspirin   81 mg PO DAILY 





05/31/17 09:15


Metoprolol Tartrate [Lopressor]   25 mg PO Q6H 





05/31/17 21:00


Magnesium Oxide   400 mg PO BID 





06/01/17 05:00


BASIC METABOLIC PANEL,BMP [CHEM] Timed 


CBC WITH AUTO DIFF [HEME] Timed 


CREATINE KINASE,CK [CHEM] Timed 


DIGOXIN [CHEM] Timed 





06/01/17 08:00


Echo Comp wo Cont [US] Urgent 














- Plan


Plan:: 





ASSESSMENT AND PLAN





SHORTNESS OF BREATH ASSOCIATED WITH MARKED PERIPHERAL EDEMA-suspect underlying 

cardiac dysfunction, also has significant systolic murmur noted on physical 

examination.  Recent symptoms of increased shortness of breath, peripheral edema

, PND, orthopnea, associated with atrial fibrillation and rapid ventricular 

response.  Symptomatically improved with diuresis, less shortness of breath and 

edema.  CT scan of the chest showed no evidence of pulmonary emboli, did 

document some cardiac enlargement as well as pulmonary edema


-2 g sodium diet


-Lasix 20 mg IV every 12 hours


-Echocardiogram to assess left ventricular function and valvular status





ATRIAL FIBRILLATION WITH RAPID VENTRICULAR RESPONSE-unknown length of duration.

  Rate control has improved over the past 24 hours with use of IV digoxin, will 

continue to hold Cardizem and start beta blocker therapy today.


-Hold oral Cardizem 


-Metoprolol 25 mg by mouth every 6 hours


-Digoxin IV


-digoxin level in a.m.





ELEVATED CK-noted on previous admission marked elevation, CK-MB also found to 

be elevated but only in the range of 2-3% of total.  CK this morning is now 

within normal range


-Repeat CK in a.m.





MAINTENANCE ISSUES


-DVT prophylaxis; Lovenox 40 mg subcutaneous daily


-GI prophylaxis; not required


-Schafer catheter; not required


-Nutrition; 2 g sodium diet


-Nicotine dependence; not indicated





CODE STATUS-full code





ADMISSION STATUS-patient will be admitted to inpatient status, expect at least 

a 2 night hospital stay for evaluation and management of problems as outlined 

above.  At the time of this admission I do not reasonably expected evaluation 

and management of this problem will require more than a 96 hour hospital stay.





DISPOSITION-anticipate discharge to home after the hospital stay.





PRIMARY CARE PROVIDER-Dr. Aguilera

## 2017-06-01 NOTE — PCM.PN
- General Info


Date of Service: 06/01/17


Functional Status: Reports: tolerating diet, ambulating, urinating





- Review of Systems


General: Denies: Fever, Weakness, Chills


Pulmonary: Reports: no symptoms


Cardiovascular: Reports: Palpitations, Edema


Gastrointestinal: Reports: No symptoms


Systems Review Comment:: 





This patient has had further good diuresis over the past 24 hours and is noted 

significant improvement in his peripheral edema as well as shortness of breath.

  Unfortunately heart rate remains elevated in the range of 120 to 130, blood 

pressures have remained somewhat borderline.  Echocardiogram was obtained today 

and show severely decreased left ventricular function as well as chamber 

enlargement.  Formal report is pending from cardiology.  Discussed with 

cardiology on call in [place], they have recommended a trial of IV Esmolol for 

control of heart rate.  Further evaluation for ischemic disease.





- Patient Data


Vitals - most recent: 


 Last Vital Signs











Temp  98.4 F   06/01/17 14:00


 


Pulse  109 H  06/01/17 14:32


 


Resp  16   06/01/17 16:00


 


BP  101/77   06/01/17 16:00


 


Pulse Ox  98   06/01/17 16:00











Weight - most recent: 172 lb 6.4 oz


I&O - last 24 hours: 


 Intake & Output











 06/01/17 06/01/17 06/01/17





 06:59 14:59 22:59


 


Intake Total  960 


 


Output Total 2100 1250 


 


Balance -2100 -290 











Lab Results last 24 hrs: 


 Laboratory Results - last 24 hr











  06/01/17 06/01/17 06/01/17 Range/Units





  04:34 04:34 13:52 


 


WBC  11.0    (4.5-11.0)  K/uL


 


RBC  5.05    (4.30-5.90)  M/uL


 


Hgb  16.0 H    (12.0-15.0)  g/dL


 


Hct  46.6    (40.0-54.0)  %


 


MCV  92    (80-98)  fL


 


MCH  32 H    (27-31)  pg


 


MCHC  34    (32-36)  %


 


Plt Count  190    (150-400)  K/uL


 


Neut % (Auto)  56    (36-66)  %


 


Lymph % (Auto)  31    (24-44)  %


 


Mono % (Auto)  11 H    (2-6)  %


 


Eos % (Auto)  2    (2-4)  %


 


Baso % (Auto)  1    (0-1)  %


 


PT    12.5 H  (9.5-12.0)  sec


 


INR    1.17  (0.80-1.20)  


 


Sodium   142   (140-148)  mmol/L


 


Potassium   4.5   (3.6-5.2)  mmol/L


 


Chloride   106   (100-108)  mmol/L


 


Carbon Dioxide   29   (21-32)  mmol/L


 


Anion Gap   6.8   (5.0-14.0)  mmol/L


 


BUN   24 H   (7-18)  mg/dL


 


Creatinine   1.3   (0.8-1.3)  mg/dL


 


Est Cr Clr Drug Dosing   61.99   mL/min


 


Estimated GFR (MDRD)   58 L   (>60)  


 


Glucose   89   ()  mg/dL


 


Calcium   8.7   (8.5-10.1)  mg/dL


 


Creatine Kinase   198   ()  U/L


 


Digoxin   0.57 L   (0.90-2.00)  ng/mL











Med Orders - Current: 


 Current Medications





Acetaminophen (Tylenol)  650 mg PO Q4H PRN


   PRN Reason: Pain (Mild 1-3)/fever


Aspirin (Aspirin)  81 mg PO DAILY [facility]


   Last Admin: 06/01/17 08:40 Dose:  81 mg


Digoxin (Lanoxin)  250 mcg PO DAILY@1300 [facility]


Docusate Sodium (Colace)  100 mg PO BID PRN


   PRN Reason: Constipation


Doxycycline Hyclate (Vibramycin)  100 mg PO BID [facility]


   Last Admin: 06/01/17 08:40 Dose:  100 mg


Enoxaparin Sodium (Lovenox)  80 mg SUBCUT Q12H [facility]


   Last Admin: 06/01/17 16:43 Dose:  80 mg


Furosemide (Lasix)  40 mg PO DAILY [facility]


Esmolol HCl (Brevibloc In Ns Premix)  2.5 gm in 250 mls @ 23.46 mls/hr IV 

TITRATE [facility]; 50 MCG/KG/MIN


   PRN Reason: Protocol


   Last Titration: 06/01/17 16:57 Dose:  125 mcg/kg/min, 58.65 mls/hr


Lorazepam (Ativan)  1 mg PO BEDTIME PRN


   PRN Reason: Sleep


   Last Admin: 05/31/17 21:50 Dose:  1 mg


Magnesium Hydroxide (Milk Of Magnesia)  30 ml PO Q12H PRN


   PRN Reason: Constipation


Magnesium Oxide (Magnesium Oxide)  400 mg PO BID [facility]


   Last Admin: 06/01/17 08:40 Dose:  400 mg


Ondansetron HCl (Zofran)  4 mg IV Q4H PRN


   PRN Reason: Nausea/Vomiting


Oxycodone HCl (Oxycodone)  5 mg PO Q4H PRN


   PRN Reason: Pain (moderate 4-6)


Polyethylene Glycol (Miralax)  17 gm PO DAILY PRN


   PRN Reason: Constipation


Sodium Chloride (Saline Flush)  10 ml FLUSH ASDIRECTED PRN


   PRN Reason: Keep Vein Open





Discontinued Medications





Digoxin (Lanoxin)  250 mcg IVPUSH ONETIME ONE


   Stop: 05/30/17 11:20


   Last Admin: 05/30/17 11:42 Dose:  250 mcg


Digoxin (Lanoxin)  250 mcg IVPUSH ONETIME ONE


   Stop: 05/30/17 16:46


   Last Admin: 05/30/17 18:18 Dose:  250 mcg


Digoxin (Lanoxin)  250 mcg IVPUSH ONETIME ONE


   Stop: 05/31/17 08:31


   Last Admin: 05/31/17 09:31 Dose:  250 mcg


Digoxin (Lanoxin)  250 mcg IVPUSH ONETIME ONE


   Stop: 06/01/17 09:01


   Last Admin: 06/01/17 08:41 Dose:  250 mcg


Diltiazem HCl (Cardizem Cd)  360 mg PO DAILY [facility]


Enoxaparin Sodium (Lovenox)  40 mg SUBCUT DAILY [facility]


   Last Admin: 06/01/17 08:40 Dose:  40 mg


Furosemide (Lasix)  20 mg IV ONETIME ONE


   Stop: 05/30/17 11:01


   Last Admin: 05/30/17 14:16 Dose:  20 mg


Furosemide (Lasix)  20 mg IVPUSH Q12H [facility]


   Last Admin: 06/01/17 06:13 Dose:  20 mg


Sodium Chloride (Normal Saline)  100 mls @ 3 mls/sec IV ONETIME ONE


   Stop: 05/30/17 16:35


   Last Admin: 05/30/17 16:57 Dose:  3 mls/sec


Iopamidol (Isovue-370 (76%))  100 ml IV .AS DIRECTED [facility]


   Stop: 05/30/17 23:00


   Last Admin: 05/30/17 16:57 Dose:  100 ml


Magnesium Oxide (Magnesium Oxide)  800 mg PO ONETIME ONE


   Stop: 05/31/17 08:31


   Last Admin: 05/31/17 09:31 Dose:  800 mg


Metoprolol Tartrate (Lopressor)  25 mg PO Q6H [facility]


Metoprolol Tartrate (Lopressor)  25 mg PO Q6H [facility]


   Last Admin: 06/01/17 09:36 Dose:  25 mg


Potassium Chloride (Klor-Con M20)  40 meq PO ONETIME ONE


   Stop: 05/31/17 09:01


   Last Admin: 05/31/17 09:31 Dose:  40 meq


Sodium Chloride (Saline Flush)  10 ml FLUSH ONETIME PRN


   PRN Reason: PER RADIOLOGY PROTOCOL


   Last Admin: 05/30/17 16:57 Dose:  10 ml


Warfarin Sodium (Coumadin)  7.5 mg PO ONETIME ONE


   Stop: 06/01/17 15:01


   Last Admin: 06/01/17 15:22 Dose:  7.5 mg











- Exam


Quality Assessment: DVT prophylaxis


General: alert, oriented, cooperative, mild distress


Lungs: Clear to auscultation, Normal respiratory effort


Cardiovascular: Irregular Rhythm, Tachycardia, Murmurs.  No: Bradycardia, 

Gallops, Rubs


Abdomen: bowel sounds present, soft, no tenderness, no distension


Extremities: edema


Skin: warm, dry, intact





- Problem List Review


Problem List Initiated/Reviewed/Updated: Yes





- My Orders


Last 24 Hours: 


My Active Orders





05/31/17 21:00


Magnesium Oxide   400 mg PO BID 





06/01/17 08:00


Echo Comp wo Cont [US] Urgent 





06/01/17 14:00


Esmolol/Normal Saline [Brevibloc in NS Premix] 2.5 gm in 250 ml IV TITRATE 





06/01/17 16:00


Enoxaparin [Lovenox]   80 mg SUBCUT Q12H 





06/02/17 05:00


BASIC METABOLIC PANEL,BMP [CHEM] Timed 


INR,PT,PROTHROMBIN TIME [COAG] Timed 


MAGNESIUM [CHEM] Timed 





06/02/17 09:00


Digoxin [Lanoxin]   250 mcg PO DAILY 


Furosemide [Lasix]   40 mg PO DAILY 














- Plan


Plan:: 





ASSESSMENT AND PLAN





CONGESTIVE HEART FAILURE WITH SEVERELY DECREASED LEFT VENTRICULAR FUNCTION-he 

has diuresed well with improvement in shortness of breath as well as peripheral 

edema.  Heart rate remains elevated despite current beta blocker therapy and 

digoxin.


-2 g sodium diet


-Lasix 20 mg IV every 12 hours


-When stable will need to proceed with evaluation for ischemic disease


-IV esmolol him up for heart rate control, then plan to transition back to oral 

blocker therapy


-Digoxin 0.25 mg by mouth daily


-Repeat dig level in a.m.


-Lovenox 80 mg subcutaneous twice daily


-Daily warfarin


-Recheck INR in a.m.





ATRIAL FIBRILLATION WITH RAPID VENTRICULAR RESPONSE-unknown length of duration.

  Rate has remained elevated, we'll try management as above


-Management as above 





ELEVATED CK-now normalized 





MAINTENANCE ISSUES


-DVT prophylaxis;  therapeutic Lovenox as above 


-GI prophylaxis; not required


-Schafer catheter; not required


-Nutrition; 2 g sodium diet


-Nicotine dependence; not indicated





CODE STATUS-full code





ADMISSION STATUS-patient will be admitted to inpatient status, expect at least 

a 2 night hospital stay for evaluation and management of problems as outlined 

above.  At the time of this admission I do not reasonably expected evaluation 

and management of this problem will require more than a 96 hour hospital stay.





DISPOSITION-anticipate discharge to home after the hospital stay.





PRIMARY CARE PROVIDER-Dr. Aguilera

## 2017-06-02 NOTE — PCM.PN
- General Info


Date of Service: 06/02/17


Functional Status: Reports: pain controlled, tolerating diet, ambulating





- Review of Systems


General: Reports: Weakness


Cardiovascular: Reports: Edema.  Denies: Chest Pain


Systems Review Comment:: 





No acute events overnight.  Continues to feel better with no significant 

shortness of breath.  No episodes of chest pain.  Minimal lower extremity edema 

at this time.  Tolerating amiodarone infusion well.  Unfortunately his heart 

rate remains elevated but is a little bit better today.  He has not had any 

fevers.  Appetite has been improving.





- Patient Data


Vitals - most recent: 


 Last Vital Signs











Temp  35.9 C   06/02/17 07:46


 


Pulse  103 H  06/02/17 09:00


 


Resp  17   06/02/17 09:00


 


BP  103/67   06/02/17 09:00


 


Pulse Ox  96   06/02/17 09:00











Weight - most recent: 79.651 kg


I&O - last 24 hours: 


 Intake & Output











 06/01/17 06/02/17 06/02/17





 22:59 06:59 14:59


 


Intake Total 380 268 


 


Output Total 1000 350 


 


Balance -620 -82 











Lab Results last 24 hrs: 


 Laboratory Results - last 24 hr











  06/01/17 06/02/17 06/02/17 Range/Units





  13:52 04:37 04:37 


 


PT  12.5 H  12.6 H   (9.5-12.0)  sec


 


INR  1.17  1.18   (0.80-1.20)  


 


Sodium    139 L  (140-148)  mmol/L


 


Potassium    4.2  (3.6-5.2)  mmol/L


 


Chloride    105  (100-108)  mmol/L


 


Carbon Dioxide    27  (21-32)  mmol/L


 


Anion Gap    11.2  (5.0-14.0)  mmol/L


 


BUN    25 H  (7-18)  mg/dL


 


Creatinine    1.1  (0.8-1.3)  mg/dL


 


Est Cr Clr Drug Dosing    73.26  mL/min


 


Estimated GFR (MDRD)    > 60  (>60)  


 


Glucose    99  ()  mg/dL


 


Calcium    8.4 L  (8.5-10.1)  mg/dL


 


Magnesium    2.1  (1.8-2.4)  mg/dL











Med Orders - Current: 


 Current Medications





Acetaminophen (Tylenol)  650 mg PO Q4H PRN


   PRN Reason: Pain (Mild 1-3)/fever


Aspirin (Aspirin)  81 mg PO DAILY [facility]


   Last Admin: 06/02/17 09:26 Dose:  81 mg


Digoxin (Lanoxin)  250 mcg PO DAILY@1300 [facility]


Docusate Sodium (Colace)  100 mg PO BID PRN


   PRN Reason: Constipation


Doxycycline Hyclate (Vibramycin)  100 mg PO BID [facility]


   Last Admin: 06/02/17 09:26 Dose:  100 mg


Enoxaparin Sodium (Lovenox)  80 mg SUBCUT Q12H [facility]


   Last Admin: 06/02/17 04:25 Dose:  80 mg


Furosemide (Lasix)  40 mg PO DAILY [facility]


   Last Admin: 06/02/17 09:26 Dose:  40 mg


Amiodarone HCl 450 mg/ (Dextrose/Water)  250 mls @ 33.33 mls/hr IV ASDIRECTED 

[facility]; 1 MG/MIN


   PRN Reason: Protocol


   Last Admin: 06/02/17 04:23 Dose:  0.5 mg/min, 16.66 mls/hr


Lorazepam (Ativan)  1 mg PO BEDTIME PRN


   PRN Reason: Sleep


   Last Admin: 06/01/17 23:39 Dose:  1 mg


Magnesium Hydroxide (Milk Of Magnesia)  30 ml PO Q12H PRN


   PRN Reason: Constipation


Magnesium Oxide (Magnesium Oxide)  400 mg PO BID [facility]


   Last Admin: 06/02/17 09:26 Dose:  400 mg


Ondansetron HCl (Zofran)  4 mg IV Q4H PRN


   PRN Reason: Nausea/Vomiting


Oxycodone HCl (Oxycodone)  5 mg PO Q4H PRN


   PRN Reason: Pain (moderate 4-6)


Polyethylene Glycol (Miralax)  17 gm PO DAILY PRN


   PRN Reason: Constipation


Sodium Chloride (Saline Flush)  10 ml FLUSH ASDIRECTED PRN


   PRN Reason: Keep Vein Open





Discontinued Medications





Digoxin (Lanoxin)  250 mcg IVPUSH ONETIME ONE


   Stop: 05/30/17 11:20


   Last Admin: 05/30/17 11:42 Dose:  250 mcg


Digoxin (Lanoxin)  250 mcg IVPUSH ONETIME ONE


   Stop: 05/30/17 16:46


   Last Admin: 05/30/17 18:18 Dose:  250 mcg


Digoxin (Lanoxin)  250 mcg IVPUSH ONETIME ONE


   Stop: 05/31/17 08:31


   Last Admin: 05/31/17 09:31 Dose:  250 mcg


Digoxin (Lanoxin)  250 mcg IVPUSH ONETIME ONE


   Stop: 06/01/17 09:01


   Last Admin: 06/01/17 08:41 Dose:  250 mcg


Diltiazem HCl (Cardizem Cd)  360 mg PO DAILY [facility]


Enoxaparin Sodium (Lovenox)  40 mg SUBCUT DAILY [facility]


   Last Admin: 06/01/17 08:40 Dose:  40 mg


Furosemide (Lasix)  20 mg IV ONETIME ONE


   Stop: 05/30/17 11:01


   Last Admin: 05/30/17 14:16 Dose:  20 mg


Furosemide (Lasix)  20 mg IVPUSH Q12H [facility]


   Last Admin: 06/01/17 06:13 Dose:  20 mg


Sodium Chloride (Normal Saline)  100 mls @ 3 mls/sec IV ONETIME ONE


   Stop: 05/30/17 16:35


   Last Admin: 05/30/17 16:57 Dose:  3 mls/sec


Esmolol HCl (Brevibloc In Ns Premix)  2.5 gm in 250 mls @ 23.46 mls/hr IV 

TITRATE [facility]; 50 MCG/KG/MIN


   PRN Reason: Protocol


   Last Admin: 06/01/17 19:10 Dose:  200 mcg/kg/min, 93.84 mls/hr


Amiodarone HCl/Dextrose 150 mg (/ Premix)  100 mls @ 600 mls/hr IV ASDIRECTED 

ONE


   Stop: 06/01/17 20:24


   Last Admin: 06/01/17 20:15 Dose:  600 mls/hr


Iopamidol (Isovue-370 (76%))  100 ml IV .AS DIRECTED [facility]


   Stop: 05/30/17 23:00


   Last Admin: 05/30/17 16:57 Dose:  100 ml


Magnesium Oxide (Magnesium Oxide)  800 mg PO ONETIME ONE


   Stop: 05/31/17 08:31


   Last Admin: 05/31/17 09:31 Dose:  800 mg


Metoprolol Tartrate (Lopressor)  25 mg PO Q6H [facility]


Metoprolol Tartrate (Lopressor)  25 mg PO Q6H [facility]


   Last Admin: 06/01/17 09:36 Dose:  25 mg


Potassium Chloride (Klor-Con M20)  40 meq PO ONETIME ONE


   Stop: 05/31/17 09:01


   Last Admin: 05/31/17 09:31 Dose:  40 meq


Sodium Chloride (Saline Flush)  10 ml FLUSH ONETIME PRN


   PRN Reason: PER RADIOLOGY PROTOCOL


   Last Admin: 05/30/17 16:57 Dose:  10 ml


Warfarin Sodium (Coumadin)  7.5 mg PO ONETIME ONE


   Stop: 06/01/17 15:01


   Last Admin: 06/01/17 15:22 Dose:  7.5 mg











- Exam


Quality Assessment: No: supplemental oxygen


General: alert, oriented, cooperative, no acute distress


Neck: supple


Lungs: Clear to auscultation, Normal respiratory effort


Cardiovascular: Irregular Rhythm, Tachycardia.  No: Murmurs


Abdomen: bowel sounds present, soft, no distension


Extremities: no cyanosis, edema (pitting edema both lower legs)


Skin: warm, dry


Psy/Mental Status: alert, normal affect





- Problem List Review


Problem List Initiated/Reviewed/Updated: Yes





- My Orders


Last 24 Hours: 


My Active Orders





06/02/17 09:27


Cardiac Education [RC] DAILY 


Heart Failure Education [OM.PC] Routine 





06/02/17 13:00


Warfarin [Coumadin]   7.5 mg PO DAILY@1300 





06/02/17 21:00


Carvedilol [Coreg]   3.125 mg PO BID 





06/03/17 05:00


BASIC METABOLIC PANEL,BMP [CHEM] Timed 


CBC W/O DIFF,HEMOGRAM [HEME] Timed (1) 


INR,PT,PROTHROMBIN TIME [COAG] Timed 





06/05/17 07:00


Myocardial Perf Spect Multi [NM] Routine 














- Plan


Plan:: 





ASSESSMENT AND PLAN





CONGESTIVE HEART FAILURE WITH SEVERELY DECREASED LEFT VENTRICULAR FUNCTION - 

ischemic versus tachycardia -induced cardiomyopathy.  volume status much better 

with diuresis.  Energy and appetite have both improved.  Tolerating medications 

fairly well but still has difficult to control atrial fibrillation.


-Oral diuretics 


-Digoxin 0.25 mg by mouth daily


-Repeat dig level in a.m.


-Enoxaparin  80 mg subcutaneous twice daily


-Daily warfarin


-Recheck INR in a.m.


-Lexiscan stress test early next week





ATRIAL FIBRILLATION WITH RAPID VENTRICULAR RESPONSE - unknown length of 

duration.  Rate has remained elevated, anticoagulation has been initiated.  

Rate has been very difficult to control.  


-finish amiodarone infusion 


-Start carvedilol this evening 





MAINTENANCE ISSUES


-DVT prophylaxis;  therapeutic  enoxaparin as above 


-GI prophylaxis; not required


-Schafer catheter; not required


-Nutrition; 2 g sodium diet





ADMISSION STATUS-patient will be admitted to inpatient status, expect at least 

a 2 night hospital stay for evaluation and management of problems as outlined 

above.  At the time of this admission I do not reasonably expected evaluation 

and management of this problem will require more than a 96 hour hospital stay.





DISPOSITION - anticipate discharge to home after the hospital stay.





Horacio Clayton M.D.

## 2017-06-03 NOTE — PCM.DCSUM1
**Discharge Summary





- Hospital Course


Brief History: 52-year-old male with recent history of atrial fibrillation and 

rapid ventricular response who presented as a direct admission after clinic 

evaluation revealed atrial fibrillation and rapid ventricular response as well 

as  impressive peripheral edema.





- Discharge Data


Discharge Date: 06/03/17


Discharge Disposition: Home, Self-Care 01


Condition: Good





- Discharge Diagnosis/Problem(s)


(1) Acute systolic CHF (congestive heart failure), NYHA class 3


SNOMED Code(s): 809063848, 483819426, [id number]


   ICD Code: I50.21 - ACUTE SYSTOLIC (CONGESTIVE) HEART FAILURE   Status: Acute

   





(2) Atrial fibrillation with rapid ventricular response


SNOMED Code(s): 585765079679570


   ICD Code: I48.91 - UNSPECIFIED ATRIAL FIBRILLATION   Status: Acute   





- Patient Summary/Data


Consults: 


 Consultations





05/30/17 14:23


Consult to Dietary [Consult to Dietician] [CONS] Routine 


   Comment: 


   Physician Instructions: 


   Quantity: 


   Reason for Consult: low na diet instruction











Hospital Course: 





Caden  was directly admitted to the hospital from the clinic after presenting 

with atrial fibrillation and rapid ventricular response.  Since recent hospital 

discharge she had progressive shortness of breath as well as significant 

progression of lower extremity edema.  His energy has declined very quickly.  

Evaluation in the clinic showed significant elevation of his heart rate and 

evidence for congestive heart failure.  After admission to the hospital he was 

aggressively diuresed using furosemide.  He had an excellent response to 

diuresis and throughout the course of the hospital stay we have removed more 

than 9 kg worth of fluid.  He is back to his normal weight at the time of 

discharge.  Kidneys have tolerated the diuresis well.  Regarding his atrial 

fibrillation, multiple different medications were attempted and significant 

difficulties were encountered trying to slow down his heart rate.  His blood 

pressure is borderline low we have not been able to aggressively utilize 

calcium channel blockers or beta blockers.  Trials included diltiazem which 

dropped his blood pressure as well as a small but unfortunately this medication 

did not work well and her supply was very limited.  Digoxin has been titrated 

to a maximum dose.  Low-dose beta blockers did not seem to help much when 

metoprolol was utilized. I did start him on carvedilol towards the end of the 

hospital stay and this seems to be helping a little bit in addition to the 

amiodarone and digoxin.  We have started him on anticoagulation after 

discussing the risks and benefits.  His INR is not yet therapeutic and he will 

be discharged with enoxaparin to bridge until his INR is therapeutic.  The plan 

is for a repeat INR in 2 days, when he returns for his stress test.  I will 

make any warfarin adjustments at that point.  He would benefit from a referral 

to the Coumadin clinic for additional outpatient management.  His CHADSS-VASC 

score is 1 with CHF/LV dysfunction. 





We did complete an echocardiogram during the hospital stay which showed 

significantly reduced ejection fraction around 20-25%.  This case was discussed 

with the on-call cardiology provider at [facility] during the hospital 

stay.  Discussions were held about rate control as well as further ischemic 

workup.  There is no strong indication for urgent transfer and evaluation.  He 

does have outpatient followup scheduled in approximately one week's time with 

the cardiology team.  He is scheduled for a Lexiscan stress test which will be 

completed 2 days after hospital discharge.  I will be communicating the results 

of this to him after the test is complete.





I believe he is safe for outpatient management at this time.  He has had an 

impressive diuresis and significant improvement in his symptoms.  We still have 

not achieved optimal rate control but I'm not sure that additional medication 

adjustments in the short-term will be worth him staying in the hospital an 

additional night or 2.  I am hopeful that with time his cardiomyopathy will 

improve and his rate control will improve as well.  The 2 most likely potential 

culprits include a tachycardia-induced cardiomyopathy versus ischemic 

cardiomyopathy.  He does have the ischemic workup scheduled in 48 hours time.  

He has a fairly close followup appointment with cardiology following hospital 

discharge as well.





- Patient Instructions


Diet: Heart Healthy Diet, Low Sodium


Activity: As Tolerated


Driving: May Drive Today


Showering/Bathing: May Shower


Notify Provider of: Fever, Increased Pain, Nausea and/or Vomiting


Other/Special Instructions: 1. You were in the hospital for management of 

atrial fibrillation with rapid ventricular response and acute systolic 

congestive heart failure. Your heart function is reduced from normal and this 

led to a fluid build up in your body. He have started several new medications 

to help your heart including carvedilol and digoxin to slow your heart rate 

down. The furosemide will help to keep your fluid level normal but leading to 

increased urine production and less water in your body.  The amiodarone will 

help your heart rate slow down and could help your heart return to a normal 

rhythm. You should take the amiodarone 400 mg twice daily for two weeks then 

200 mg twice daily for two weeks then 200 mg once daily there after.  2. Atrial 

fibrillation can increase your risk of developing a blood clot and potentially 

a stroke. I recommend that you continue warfarin (Coumadin) to help reduce this 

risk. You warfarin has not yet reached a therapeutic level and you will need to 

continue your enoxaprin injections once daily until your blood level is 

therapeutic, hopefully 4 - 5 days or so. Please take warfarin 10 mg daily in 

the evening tonight and tomorrow. Monday we will check your warfarin level and 

adjust the dosage if needed. Please report to the ER registration desk before 

your stress test for a blood draw.  3. I would like you to follow up with the 

Coumadin Clinic at [facility] in [place]. You should have your blood checked 

Wednesday of next week. You are on warfarin for atrial fibrillation your goal 

INR (blood level) is between 2-3 and you will be on the medication 

indefinitely.  4. Please see medical attention if you develop fever >101, your 

breathing suddenly worsens, you edema rapidly returns or you develop chest pain 

or pressure.  5. You are scheduled for a stress test Monday, June 5th at 9:15. 

This will help determine if you have blockages in your coronary arteries.  6. 

If you have any concerns or troubles between discharge and the morning Monday, June 12 please call me at [phone] and I will help guide you through the 

problem or recommend evaluation if we cannot resolve the issue over the phone.





- Discharge Plan


Prescriptions/Med Rec: 


Amiodarone [Cordarone] 400 mg PO BID #84 tablet


Carvedilol [Coreg] 6.25 mg PO BID #60 tablet


Digoxin 250 mcg PO DAILY #30 tablet


Enoxaparin [Lovenox] 120 mg SUBCUT DAILY #5 syringe


Furosemide [Lasix] 40 mg PO DAILY #30 tablet


Warfarin [Coumadin] 10 mg PO DAILY@1300 #60 tablet


Home Medications: 


 Home Meds





LORazepam [Ativan] 1 mg PO BEDTIME PRN #15 tablet 05/23/17 [Rx]


Aspirin [Halfprin] 81 mg PO DAILY 05/30/17 [History]


Amiodarone [Cordarone] 400 mg PO BID #84 tablet 06/03/17 [Rx]


Carvedilol [Coreg] 6.25 mg PO BID #60 tablet 06/03/17 [Rx]


Digoxin 250 mcg PO DAILY #30 tablet 06/03/17 [Rx]


Enoxaparin [Lovenox] 120 mg SUBCUT DAILY #5 syringe 06/03/17 [Rx]


Furosemide [Lasix] 40 mg PO DAILY #30 tablet 06/03/17 [Rx]


Warfarin [Coumadin] 10 mg PO DAILY@1300 #60 tablet 06/03/17 [Rx]








Patient Handouts:  Warfarin: What You Need to Know, Warfarin Coagulopathy, 

Atrial Fibrillation, Heart Failure, Digoxin tablets or capsules, Carvedilol 

tablets, Furosemide tablets


Referrals: 


Jessica Pedroza MD [Primary Care Provider] -  (two weeks - f/u hospital stay 

for CHF and afib with RVR)





- Patient Data


Vitals - Most Recent: 


 Last Vital Signs











Temp  35.7 C   06/03/17 07:28


 


Pulse  120 H  06/03/17 09:00


 


Resp  18   06/03/17 09:00


 


BP  96/58 L  06/03/17 09:00


 


Pulse Ox  99   06/03/17 09:00











Weight - Most Recent: 77.337 kg


I&O - Last 24 hours: 


 Intake & Output











 06/02/17 06/03/17 06/03/17





 22:59 06:59 14:59


 


Intake Total 844 42 400


 


Output Total 850 700 


 


Balance -6 -658 400











Lab Results - Last 24 hrs: 


 Laboratory Results - last 24 hr











  06/03/17 06/03/17 06/03/17 Range/Units





  05:55 05:55 05:55 


 


WBC  8.6    (4.5-11.0)  K/uL


 


RBC  5.20    (4.30-5.90)  M/uL


 


Hgb  16.1 H    (12.0-15.0)  g/dL


 


Hct  47.6    (40.0-54.0)  %


 


MCV  92    (80-98)  fL


 


MCH  31    (27-31)  pg


 


MCHC  34    (32-36)  %


 


Plt Count  186    (150-400)  K/uL


 


PT   12.0   (9.5-12.0)  sec


 


INR   1.13   (0.80-1.20)  


 


Sodium    139 L  (140-148)  mmol/L


 


Potassium    4.6  (3.6-5.2)  mmol/L


 


Chloride    106  (100-108)  mmol/L


 


Carbon Dioxide    26  (21-32)  mmol/L


 


Anion Gap    11.6  (5.0-14.0)  mmol/L


 


BUN    24 H  (7-18)  mg/dL


 


Creatinine    1.2  (0.8-1.3)  mg/dL


 


Est Cr Clr Drug Dosing    67.16  mL/min


 


Estimated GFR (MDRD)    > 60  (>60)  


 


Glucose    92  ()  mg/dL


 


Calcium    8.5  (8.5-10.1)  mg/dL











Med Orders - Current: 


 Current Medications





Acetaminophen (Tylenol)  650 mg PO Q4H PRN


   PRN Reason: Pain (Mild 1-3)/fever


Amiodarone HCl (Cordarone)  400 mg PO BID [facility]


   Last Admin: 06/03/17 09:00 Dose:  400 mg


Aspirin (Aspirin)  81 mg PO DAILY [facility]


   Last Admin: 06/03/17 08:56 Dose:  81 mg


Carvedilol (Coreg)  6.25 mg PO BID [facility]


   Last Admin: 06/03/17 08:56 Dose:  6.25 mg


Digoxin (Lanoxin)  250 mcg PO DAILY@1300 [facility]


   Last Admin: 06/02/17 13:06 Dose:  250 mcg


Docusate Sodium (Colace)  100 mg PO BID PRN


   PRN Reason: Constipation


Doxycycline Hyclate (Vibramycin)  100 mg PO BID [facility]


   Last Admin: 06/03/17 08:55 Dose:  100 mg


Enoxaparin Sodium (Lovenox)  80 mg SUBCUT Q12H [facility]


   Last Admin: 06/03/17 05:40 Dose:  80 mg


Furosemide (Lasix)  40 mg PO DAILY [facility]


   Last Admin: 06/03/17 09:00 Dose:  40 mg


Lorazepam (Ativan)  1 mg PO BEDTIME PRN


   PRN Reason: Sleep


   Last Admin: 06/02/17 22:05 Dose:  1 mg


Magnesium Hydroxide (Milk Of Magnesia)  30 ml PO Q12H PRN


   PRN Reason: Constipation


Magnesium Oxide (Magnesium Oxide)  400 mg PO BID [facility]


   Last Admin: 06/03/17 08:56 Dose:  400 mg


Ondansetron HCl (Zofran)  4 mg IV Q4H PRN


   PRN Reason: Nausea/Vomiting


Oxycodone HCl (Oxycodone)  5 mg PO Q4H PRN


   PRN Reason: Pain (moderate 4-6)


Polyethylene Glycol (Miralax)  17 gm PO DAILY PRN


   PRN Reason: Constipation


Sodium Chloride (Saline Flush)  10 ml FLUSH ASDIRECTED PRN


   PRN Reason: Keep Vein Open


Warfarin Sodium (Coumadin)  10 mg PO DAILY@1300 [facility]





Discontinued Medications





Carvedilol (Coreg)  3.125 mg PO BID [facility]


   Last Admin: 06/02/17 20:56 Dose:  3.125 mg


Digoxin (Lanoxin)  250 mcg IVPUSH ONETIME ONE


   Stop: 05/30/17 11:20


   Last Admin: 05/30/17 11:42 Dose:  250 mcg


Digoxin (Lanoxin)  250 mcg IVPUSH ONETIME ONE


   Stop: 05/30/17 16:46


   Last Admin: 05/30/17 18:18 Dose:  250 mcg


Digoxin (Lanoxin)  250 mcg IVPUSH ONETIME ONE


   Stop: 05/31/17 08:31


   Last Admin: 05/31/17 09:31 Dose:  250 mcg


Digoxin (Lanoxin)  250 mcg IVPUSH ONETIME ONE


   Stop: 06/01/17 09:01


   Last Admin: 06/01/17 08:41 Dose:  250 mcg


Diltiazem HCl (Cardizem Cd)  360 mg PO DAILY [facility]


Enoxaparin Sodium (Lovenox)  40 mg SUBCUT DAILY [facility]


   Last Admin: 06/01/17 08:40 Dose:  40 mg


Furosemide (Lasix)  20 mg IV ONETIME ONE


   Stop: 05/30/17 11:01


   Last Admin: 05/30/17 14:16 Dose:  20 mg


Furosemide (Lasix)  20 mg IVPUSH Q12H [facility]


   Last Admin: 06/01/17 06:13 Dose:  20 mg


Sodium Chloride (Normal Saline)  100 mls @ 3 mls/sec IV ONETIME ONE


   Stop: 05/30/17 16:35


   Last Admin: 05/30/17 16:57 Dose:  3 mls/sec


Esmolol HCl (Brevibloc In Ns Premix)  2.5 gm in 250 mls @ 23.46 mls/hr IV 

TITRATE [facility]; 50 MCG/KG/MIN


   PRN Reason: Protocol


   Last Admin: 06/01/17 19:10 Dose:  200 mcg/kg/min, 93.84 mls/hr


Amiodarone HCl 450 mg/ (Dextrose/Water)  250 mls @ 33.33 mls/hr IV ASDIRECTED 

[facility]; 1 MG/MIN


   PRN Reason: Protocol


   Stop: 06/02/17 19:46


   Last Admin: 06/02/17 04:23 Dose:  0.5 mg/min, 16.66 mls/hr


Amiodarone HCl/Dextrose 150 mg (/ Premix)  100 mls @ 600 mls/hr IV ASDIRECTED 

ONE


   Stop: 06/01/17 20:24


   Last Admin: 06/01/17 20:15 Dose:  600 mls/hr


Iopamidol (Isovue-370 (76%))  100 ml IV .AS DIRECTED [facility]


   Stop: 05/30/17 23:00


   Last Admin: 05/30/17 16:57 Dose:  100 ml


Magnesium Oxide (Magnesium Oxide)  800 mg PO ONETIME ONE


   Stop: 05/31/17 08:31


   Last Admin: 05/31/17 09:31 Dose:  800 mg


Metoprolol Tartrate (Lopressor)  25 mg PO Q6H [facility]


Metoprolol Tartrate (Lopressor)  25 mg PO Q6H [facility]


   Last Admin: 06/01/17 09:36 Dose:  25 mg


Potassium Chloride (Klor-Con M20)  40 meq PO ONETIME ONE


   Stop: 05/31/17 09:01


   Last Admin: 05/31/17 09:31 Dose:  40 meq


Sodium Chloride (Saline Flush)  10 ml FLUSH ONETIME PRN


   PRN Reason: PER RADIOLOGY PROTOCOL


   Last Admin: 05/30/17 16:57 Dose:  10 ml


Warfarin Sodium (Coumadin)  7.5 mg PO ONETIME ONE


   Stop: 06/01/17 15:01


   Last Admin: 06/01/17 15:22 Dose:  7.5 mg


Warfarin Sodium (Coumadin)  7.5 mg PO DAILY@1300 [facility]


   Last Admin: 06/02/17 13:05 Dose:  7.5 mg











*Q Meaningful Use (DIS)





- VTE *Q


VTE Criteria *Q: 








- Stroke *Q


Stroke Criteria *Q: 








- AMI *Q


AMI Criteria *Q:

## 2021-06-19 ENCOUNTER — HOSPITAL ENCOUNTER (OUTPATIENT)
Dept: HOSPITAL 11 - JP.ED | Age: 56
Setting detail: OBSERVATION
LOS: 1 days | Discharge: HOME | End: 2021-06-20
Attending: INTERNAL MEDICINE | Admitting: INTERNAL MEDICINE
Payer: COMMERCIAL

## 2021-06-19 DIAGNOSIS — I48.0: Primary | ICD-10-CM

## 2021-06-19 DIAGNOSIS — Z79.899: ICD-10-CM

## 2021-06-19 DIAGNOSIS — I50.9: ICD-10-CM

## 2021-06-19 DIAGNOSIS — K43.9: ICD-10-CM

## 2021-06-19 DIAGNOSIS — F17.200: ICD-10-CM

## 2021-06-19 PROCEDURE — G0378 HOSPITAL OBSERVATION PER HR: HCPCS

## 2021-06-19 NOTE — PCM.HP.2
H&P History of Present Illness





- General


Date of Service: 06/19/21


Admit Problem/Dx: 


                           Admission Diagnosis/Problem





Admission Diagnosis/Problem      Atrial fibrillation with rapid ventricular


                                 response








Source of Information: Patient, Family, Provider


History Limitations: Reports: No Limitations





- History of Present Illness


Initial Comments - Free Text/Narative: 





CC: I've got a hernia





HPI: Caden presents to the emergency room today with upper abdominal pain and 

concern that he may have a hernia.  He has noticed this for the past couple of 

weeks.  He describes moderate to severe squeezing-like pain just below his 

sternum.  This often comes on when he has been doing a lot of lifting.  The pain

does go away with rest.  He has not taken anything to make the pain better.  

This does seem to be steadily getting worse.  He has also noticed a bulge in the

upper abdomen when the pain is intense.  He also notes that he has been feeling 

short of breath with exertion this too has been getting worse over the past 

couple of weeks and he now notices it with anything more than mild activity.  He

has not had any chest pain.  He has a rare dry cough.  He does report orthopnea 

for the past 4-5 nights.  No lower extremity edema or weight gain.  No fevers or

chills.  He does not report any palpitations.





Work-up in the emergency room revealed evidence for atrial fibrillation with a 

rapid ventricular rate and heart rates up to the 170s.  He does have a ventral 

hernia but this does not appear to be incarcerated.  Heart rate has slowed down 

some with the diltiazem bolus and a drip is being initiated.  He will be 

admitted for management of atrial fibrillation and monitoring of his hernia.





- Related Data


Allergies/Adverse Reactions: 


                                    Allergies











Allergy/AdvReac Type Severity Reaction Status Date / Time


 


No Known Allergies Allergy   Verified 06/19/21 13:52











Home Medications: 


                                    Home Meds





Metoprolol Succinate [Toprol XL 100mg] 100 mg PO DAILY 06/19/21 [History]











Past Medical History


Cardiovascular History: Reports: Afib, Arrhythmia, Heart Failure, Other (See 

Below)


Other Cardiovascular History: a fib


Respiratory History: Reports: Bronchitis, Recurrent


Gastrointestinal History: Reports: None


Genitourinary History: Reports: None


Musculoskeletal History: Reports: None


Neurological History: Reports: None


Psychiatric History: Reports: None


Endocrine/Metabolic History: Reports: None


Hematologic History: Reports: None


Immunologic History: Reports: None


Oncologic (Cancer) History: Reports: None


Dermatologic History: Reports: None





- Infectious Disease History


Infectious Disease History: Reports: Chicken Pox, Measles, Mumps





- Past Surgical History


Cardiovascular Surgical History: Reports: Cardiac Ablation


GI Surgical History: Reports: None


Musculoskeletal Surgical History: Reports: None





Social & Family History





- Family History


Cardiac: Reports: Afib, Arrhythmia, MI





- Tobacco Use


Tobacco Use Comment: current some day smoker





- Caffeine Use


Caffeine Use: Reports: Coffee, Soda





- Alcohol Use


Alcohol Use History: No


Alcohol Use in Last Twelve Months: No





- Recreational Drug Use


Recreational Drug Use: No





H&P Review of Systems





- Review of Systems:


Review Of Systems: See Below


Free Text/Narrative: 





A complete 12 point review of systems was obtained.  Pertinent positives and 

negatives are noted in the history of present illness.  All other systems were 

reviewed and were negative except as noted.





Exam





- Exam


Exam: See Below





- Vital Signs


Vital Signs: 


                                Last Vital Signs











Temp  36.6 C   06/19/21 13:36


 


Pulse  106 H  06/19/21 15:21


 


Resp  16   06/19/21 15:21


 


BP  101/82   06/19/21 15:21


 


Pulse Ox  94 L  06/19/21 15:21











Weight: 81.8 kg





- Exam


Quality Assessment: No: Supplemental Oxygen


General: Alert, Oriented, Cooperative.  No: Mild Distress


HEENT: Conjunctiva Clear, Mucosa Moist & Pink


Neck: Supple, Trachea Midline.  No: JVD


Lungs: Clear to Auscultation, Normal Respiratory Effort


Cardiovascular: Irregular Rhythm, Tachycardia.  No: Systolic Murmur


GI/Abdominal Exam: Normal Bowel Sounds, Soft, No Distention, No Mass, Tender 

(epigastrium), Hernia (6 cm round hernia subzyphoid )


Back Exam: Normal Inspection, Full Range of Motion


Extremities: No Pedal Edema.  No: Increased Warmth


Peripheral Pulses: 2+: Dorsalis Pedis (L), Dorsalis Pedis (R)


Skin: Warm, Dry


Neuro Extensive - Mental Status: Alert, Oriented x3, Nl Response to Commands


Neuro Extensive - Motor, Sensory, Reflexes: No: Dysarthria, Abnormal Motor, 

Tremor


Psychiatric: Alert, Normal Affect





- Patient Data


Lab Results Last 24 hrs: 


                         Laboratory Results - last 24 hr











  06/19/21 06/19/21 06/19/21 Range/Units





  13:56 13:56 15:28 


 


WBC  10.2    (4.5-11.0)  K/uL


 


RBC  5.16    (4.30-5.90)  M/uL


 


Hgb  15.8 H    (12.0-15.0)  g/dL


 


Hct  48.0    (40.0-54.0)  %


 


MCV  93    (80-98)  fL


 


MCH  31    (27-31)  pg


 


MCHC  33    (32-36)  %


 


Plt Count  171    (150-400)  K/uL


 


Neut % (Auto)  52.9    (36-66)  %


 


Lymph % (Auto)  33.4    (24-44)  %


 


Mono % (Auto)  11.8 H    (2-6)  %


 


Eos % (Auto)  1.3 L    (2-4)  %


 


Baso % (Auto)  0.6    (0-1)  %


 


Sodium   140   (140-148)  mmol/L


 


Potassium   4.6   (3.6-5.2)  mmol/L


 


Chloride   105   (100-108)  mmol/L


 


Carbon Dioxide   24   (21-32)  mmol/L


 


Anion Gap   11.3   (5.0-14.0)  mmol/L


 


BUN   33 H   (7-18)  mg/dL


 


Creatinine   1.2   (0.8-1.3)  mg/dL


 


Est Cr Clr Drug Dosing   64.26   mL/min


 


Estimated GFR (MDRD)   > 60   (>60)  


 


Glucose   81   ()  mg/dL


 


Calcium   8.7   (8.5-10.1)  mg/dL


 


Magnesium    2.0  (1.8-2.4)  mg/dL


 


Troponin I   < 0.017   (0.000-0.056)  ng/mL











Result Diagrams: 


                                 06/19/21 13:56





                                 06/19/21 13:56


Imaging Impressions Last 24 hrs: 


CXR-image personally reviewed-lungs clear with no mass, infiltrate or effusion. 


  ** #1 Interpretation


EKG Date: 06/19/21


Time: 13:46


Rhythm: A-Fib


Rate (Beats/Min): 155


Axis: Normal


P-Wave: Variable


QRS: Normal


ST-T: Normal


QT: Normal


EKG Interpretation Comments: 





compared to 5/30/2017 the rate is faster today but otherwise unchanged 





Sepsis Event Note





- Evaluation


Sepsis Screening Result: No Definite Risk





- Focused Exam


Vital Signs: 


                                   Vital Signs











  Temp Pulse Resp BP Pulse Ox


 


 06/19/21 15:21   106 H  16  101/82  94 L


 


 06/19/21 14:50   87  16  101/81  94 L


 


 06/19/21 14:10   108 H  16  115/80  92 L


 


 06/19/21 13:37   156 H   


 


 06/19/21 13:36  36.6 C  86  16  106/74  98


 


 06/19/21 13:35   141 H  16  106/76  96














*Q Meaningful Use (ADM)





- VTE Risk Assess *Q


Each Risk Factor Represents 1 Point: Age 41 - 59 years, Obesity ( BMI > 25 

kg/m2)


Total Score 1 Point Risk Factors: 2


Each Risk Factor Represents 2 Points: None


Total Score 2 Point Risk Factors: 0


Each Risk Factor Represents 3 Points: None


Total Score 3 Point Risk Factors: 0


Each Risk Factor Represents 5 Points: None


Total Score 5 Point Risk Factors: 0


Venous Thromboembolism Risk Factor Score *Q: 2





- Problem List


(1) Atrial fibrillation with rapid ventricular response


SNOMED Code(s): 543240573102670


   ICD Code: I48.91 - UNSPECIFIED ATRIAL FIBRILLATION   Status: Acute   Current 

Visit: Yes   





(2) Ventral hernia without obstruction or gangrene


SNOMED Code(s): 340590068


   ICD Code: K43.9 - VENTRAL HERNIA WITHOUT OBSTRUCTION OR GANGRENE   Status: 

Acute   Current Visit: Yes   


Problem List Initiated/Reviewed/Updated: Yes


Orders Last 24hrs: 


                               Active Orders 24 hr











 Category Date Time Status


 


 Patient Status Manage Transfer [TRANSFER] Routine ADT  06/19/21 15:51 Active


 


 Cardiac Monitoring [RC] .As Directed Care  06/19/21 13:53 Active


 


 EKG Documentation Completion [RC] ASDIRECTED Care  06/19/21 13:54 Active


 


 Peripheral IV Care [RC] .AS DIRECTED Care  06/19/21 13:54 Active


 


 Chest 1V Frontal [CR] Stat Exams  06/19/21 13:54 Taken


 


 TSH ULTRASENSITIVE [CHEM] Routine Lab  06/19/21 15:55 Ordered


 


 Diltiazem [Cardizem] 100 mg Med  06/19/21 14:45 Active





 Sodium Chloride 0.9% [Normal Saline] 100 ml   





 IV TITRATE   


 


 Sodium Chloride 0.9% [Normal Saline] 1,000 ml Med  06/19/21 14:00 Active





 IV ASDIRECTED   


 


 Sodium Chloride 0.9% [Saline Flush] Med  06/19/21 13:53 Active





 10 ml FLUSH ASDIRECTED PRN   


 


 Peripheral IV Insertion Adult [OM.PC] Stat Oth  06/19/21 13:53 Ordered


 


 Resuscitation Status Routine Resus Stat  06/19/21 15:52 Ordered


 


 EKG 12 Lead [EK] Stat Ther  06/19/21 13:54 Ordered








                                Medication Orders





Sodium Chloride (Normal Saline)  1,000 mls @ 125 mls/hr IV ASDIRECTED CHRIS


   Last Admin: 06/19/21 13:58  Dose: 125 mls/hr


   Documented by: PREILOR


Diltiazem HCl 100 mg/ Sodium (Chloride)  100 mls @ 5 mls/hr IV TITRATE CHRIS; Prot

ocol


Sodium Chloride (Sodium Chloride 0.9% 10 Ml Syringe)  10 ml FLUSH ASDIRECTED PRN


   PRN Reason: Keep Vein Open


   Last Admin: 06/19/21 13:58  Dose: 10 ml


   Documented by: PREILOR








Assessment/Plan Comment:: 





ASSESSMENT AND PLAN - 





Paroxysmal atrial fibrillation with rapid ventricular response-symptoms of 

dyspnea on exertion but no palpitations or chest pain.  Rate was quite rapid 

initially but has responded well to diltiazem.  Only trigger that he can think 

of was overexertion a couple weeks ago.  Labs unremarkable.  Out of the window 

for cardioversion.


-Diltiazem infusion


-Continue metoprolol


-Cardiac monitoring


-Consider anticoagulation if the rhythm persists





Ventral hernia-moderate sized and somewhat symptomatic.  Does not appear to be 

incarcerated.


-Surgical consultation tomorrow if worsening





Maintenance issues - 


-DVT prophylaxis-patient is ambulatory


-GI prophylaxis-not indicated


-Nutrition-regular


-Schafer catheter-not indicated





CODE STATUS -full code





Admission justification -this patient will be admitted for observation to manage

 his paroxysmal atrial fibrillation and monitor his abdominal hernia.


Disposition -I anticipate discharge home after the hospital stay





Primary care physician -[facility]





Horacio Clayton M.D.





- Mortality Measure


Prognosis:: Good

## 2021-06-19 NOTE — EDM.PDOC
ED HPI GENERAL MEDICAL PROBLEM





- General


Chief Complaint: Abdominal Pain


Stated Complaint: HERINA  IN STOMACH AREA


Time Seen by Provider: 06/19/21 13:18


Source of Information: Reports: Patient, RN Notes Reviewed


History Limitations: Reports: No Limitations





- History of Present Illness


INITIAL COMMENTS - FREE TEXT/NARRATIVE: 





56-year-old gentleman presents emergency department today initially complaint of

abdominal hernia he states he is noticing his had this abdominal hernia for a 

couple of weeks it seems to be bothering him as he would get short of breath 

when he does have a history of atrial fibrillation status post ablation.  

However during the initial evaluation by nursing staff was found to be in atrial

fibrillation heart rate 1 60-1 75 he does not feel any of these palpitations and

only feels short of breath when he exerts himself.  Unknown time or when this 

happened





- Related Data


                                    Allergies











Allergy/AdvReac Type Severity Reaction Status Date / Time


 


No Known Allergies Allergy   Verified 06/19/21 13:52











Home Meds: 


                                    Home Meds





Metoprolol Succinate [Toprol XL 100mg] 100 mg PO DAILY 06/19/21 [History]











Past Medical History


Cardiovascular History: Reports: Afib, Arrhythmia, Heart Failure, Other (See 

Below)


Other Cardiovascular History: a fib


Respiratory History: Reports: Bronchitis, Recurrent


Hematologic History: Reports: None


Immunologic History: Reports: None


Oncologic (Cancer) History: Reports: None


Dermatologic History: Reports: None





- Infectious Disease History


Infectious Disease History: Reports: Chicken Pox, Measles, Mumps





- Past Surgical History


Cardiovascular Surgical History: Reports: Cardiac Ablation


GI Surgical History: Reports: None


Musculoskeletal Surgical History: Reports: None





Social & Family History





- Family History


Cardiac: Reports: Afib, Arrhythmia, MI





- Tobacco Use


Tobacco Use Comment: current some day smoker





- Caffeine Use


Caffeine Use: Reports: Coffee, Soda





- Recreational Drug Use


Recreational Drug Use: No





ED ROS GENERAL





- Review of Systems


Review Of Systems: See Below


Constitutional: Reports: No Symptoms


HEENT: Reports: No Symptoms


Respiratory: Reports: Shortness of Breath


Cardiovascular: Reports: Dyspnea on Exertion.  Denies: Chest Pain, 

Lightheadedness, Palpitations


GI/Abdominal: Reports: No Symptoms





ED EXAM, GENERAL





- Physical Exam


Exam: See Below


Exam Limited By: No Limitations


General Appearance: Alert, WD/WN, No Apparent Distress


Respiratory/Chest: No Respiratory Distress, Lungs Clear, Normal Breath Sounds, 

No Accessory Muscle Use, Chest Non-Tender


Cardiovascular: Tachycardia


GI/Abdominal: Soft, Non-Tender.  No: Hernia


  ** #1 Interpretation


EKG Date: 06/19/21


Time: 15:02


Rhythm: A-Fib


Rate (Beats/Min): 155


Axis: Normal


P-Wave: Absent


QRS: Normal


ST-T: Normal


QT: Normal


Comparison: No Change





Course





- Vital Signs


Last Recorded V/S: 


                                Last Vital Signs











Temp  97.9 F   06/19/21 13:36


 


Pulse  108 H  06/19/21 14:10


 


Resp  16   06/19/21 14:10


 


BP  115/80   06/19/21 14:10


 


Pulse Ox  92 L  06/19/21 14:10














- Orders/Labs/Meds


Orders: 


                               Active Orders 24 hr











 Category Date Time Status


 


 Cardiac Monitoring [RC] .As Directed Care  06/19/21 13:53 Active


 


 EKG Documentation Completion [RC] ASDIRECTED Care  06/19/21 13:54 Active


 


 Peripheral IV Care [RC] .AS DIRECTED Care  06/19/21 13:54 Active


 


 Chest 1V Frontal [CR] Stat Exams  06/19/21 13:54 Taken


 


 Diltiazem [Cardizem] 100 mg Med  06/19/21 14:45 Active





 Sodium Chloride 0.9% [Normal Saline] 100 ml   





 IV TITRATE   


 


 Sodium Chloride 0.9% [Normal Saline] 1,000 ml Med  06/19/21 14:00 Active





 IV ASDIRECTED   


 


 Sodium Chloride 0.9% [Saline Flush] Med  06/19/21 13:53 Active





 10 ml FLUSH ASDIRECTED PRN   


 


 Peripheral IV Insertion Adult [OM.PC] Stat Oth  06/19/21 13:53 Ordered


 


 EKG 12 Lead [EK] Stat Ther  06/19/21 13:54 Ordered








                                Medication Orders





Sodium Chloride (Normal Saline)  1,000 mls @ 125 mls/hr IV ASDIRECTED CHRIS


   Last Admin: 06/19/21 13:58  Dose: 125 mls/hr


   Documented by: PREILOR


Diltiazem HCl 100 mg/ Sodium (Chloride)  100 mls @ 5 mls/hr IV TITRATE CHRIS; 

Protocol


Sodium Chloride (Sodium Chloride 0.9% 10 Ml Syringe)  10 ml FLUSH ASDIRECTED PRN


   PRN Reason: Keep Vein Open


   Last Admin: 06/19/21 13:58  Dose: 10 ml


   Documented by: PREILOR








Labs: 


                                Laboratory Tests











  06/19/21 06/19/21 Range/Units





  13:56 13:56 


 


WBC  10.2   (4.5-11.0)  K/uL


 


RBC  5.16   (4.30-5.90)  M/uL


 


Hgb  15.8 H   (12.0-15.0)  g/dL


 


Hct  48.0   (40.0-54.0)  %


 


MCV  93   (80-98)  fL


 


MCH  31   (27-31)  pg


 


MCHC  33   (32-36)  %


 


Plt Count  171   (150-400)  K/uL


 


Neut % (Auto)  52.9   (36-66)  %


 


Lymph % (Auto)  33.4   (24-44)  %


 


Mono % (Auto)  11.8 H   (2-6)  %


 


Eos % (Auto)  1.3 L   (2-4)  %


 


Baso % (Auto)  0.6   (0-1)  %


 


Sodium   140  (140-148)  mmol/L


 


Potassium   4.6  (3.6-5.2)  mmol/L


 


Chloride   105  (100-108)  mmol/L


 


Carbon Dioxide   24  (21-32)  mmol/L


 


Anion Gap   11.3  (5.0-14.0)  mmol/L


 


BUN   33 H  (7-18)  mg/dL


 


Creatinine   1.2  (0.8-1.3)  mg/dL


 


Est Cr Clr Drug Dosing   64.26  mL/min


 


Estimated GFR (MDRD)   > 60  (>60)  


 


Glucose   81  ()  mg/dL


 


Calcium   8.7  (8.5-10.1)  mg/dL


 


Troponin I   < 0.017  (0.000-0.056)  ng/mL











Meds: 


Medications











Generic Name Dose Route Start Last Admin





  Trade Name Freq  PRN Reason Stop Dose Admin


 


Sodium Chloride  1,000 mls @ 125 mls/hr  06/19/21 14:00  06/19/21 13:58





  Normal Saline  IV   125 mls/hr





  ASDIRECTED CHRIS   Administration


 


Diltiazem HCl 100 mg/ Sodium  100 mls @ 5 mls/hr  06/19/21 14:45 





  Chloride  IV  





  TITRATE CHRIS  





  Protocol  





  5 MG/HR  


 


Sodium Chloride  10 ml  06/19/21 13:53  06/19/21 13:58





  Sodium Chloride 0.9% 10 Ml Syringe  FLUSH   10 ml





  ASDIRECTED PRN   Administration





  Keep Vein Open  














Discontinued Medications














Generic Name Dose Route Start Last Admin





  Trade Name Freq  PRN Reason Stop Dose Admin


 


Diltiazem HCl  20 mg  06/19/21 13:52  06/19/21 13:58





  Diltiazem 25 Mg/5 Ml Sdv  IVPUSH  06/19/21 13:53  20 mg





  ONETIME ONE   Administration














Departure





- Departure


Time of Disposition: 15:06 (Tylenol 6-year-old ago no reported to)


Disposition: Refer to Observation


Condition: Fair


Clinical Impression: 


 Atrial fibrillation with RVR





Referrals: 


PCP,None [Primary Care Provider] - 


Forms:  ED Department Discharge





Sepsis Event Note (ED)





- Evaluation


Sepsis Screening Result: No Definite Risk





- Focused Exam


Vital Signs: 


                                   Vital Signs











  Temp Pulse Resp BP Pulse Ox


 


 06/19/21 14:10   108 H  16  115/80  92 L


 


 06/19/21 13:37   156 H   


 


 06/19/21 13:36  97.9 F  86  16  106/74  98


 


 06/19/21 13:35   141 H  16  106/76  96














- My Orders


Last 24 Hours: 


My Active Orders





06/19/21 13:53


Cardiac Monitoring [RC] .As Directed 


Sodium Chloride 0.9% [Saline Flush]   10 ml FLUSH ASDIRECTED PRN 


Peripheral IV Insertion Adult [OM.PC] Stat 





06/19/21 13:54


EKG Documentation Completion [RC] ASDIRECTED 


Peripheral IV Care [RC] .AS DIRECTED 


Chest 1V Frontal [CR] Stat 


EKG 12 Lead [EK] Stat 





06/19/21 14:00


Sodium Chloride 0.9% [Normal Saline] 1,000 ml IV ASDIRECTED 





06/19/21 14:45


Diltiazem [Cardizem] 100 mg   Sodium Chloride 0.9% [Normal Saline] 100 ml IV TI

TRATE 














- Assessment/Plan


Last 24 Hours: 


My Active Orders





06/19/21 13:53


Cardiac Monitoring [RC] .As Directed 


Sodium Chloride 0.9% [Saline Flush]   10 ml FLUSH ASDIRECTED PRN 


Peripheral IV Insertion Adult [OM.PC] Stat 





06/19/21 13:54


EKG Documentation Completion [RC] ASDIRECTED 


Peripheral IV Care [RC] .AS DIRECTED 


Chest 1V Frontal [CR] Stat 


EKG 12 Lead [EK] Stat 





06/19/21 14:00


Sodium Chloride 0.9% [Normal Saline] 1,000 ml IV ASDIRECTED 





06/19/21 14:45


Diltiazem [Cardizem] 100 mg   Sodium Chloride 0.9% [Normal Saline] 100 ml IV 

TITRATE 











Plan: 





Assessment





Acuity = acute





Site and laterality = Afib with RVR     





Etiology  = unknown history of ablation  





Manifestations = dyspnea on exertion  





Location of injury =  Home





Lab values = CBC,BMP,troponin all negative chest x-ray I did review films myself

I cannot appreciate any acute process, the official read from radiology is 

pending





Plan


called and discussed the case with Dr. Clayton at 15:00, kindly agreed to 

evaluate the patient in the ED for admission  

















 This note was dictated using dragon voice recognition software please call with

any questions on syntax or grammar.

## 2021-06-20 VITALS — SYSTOLIC BLOOD PRESSURE: 105 MMHG | DIASTOLIC BLOOD PRESSURE: 77 MMHG | HEART RATE: 98 BPM

## 2021-06-20 NOTE — [id number]
**Discharge Summary





- Hospital Course


Brief History: 56-year-old male with history of paroxysmal atrial fibrillation 

with previous ablation who presented with dyspnea on exertion as well as upper 

abdominal pain.  He was admitted for management of paroxysmal atrial 

fibrillation with rapid ventricular response and observation for a ventral 

hernia.


Diagnosis: Stroke: No





- Discharge Data


Discharge Date: 06/20/21


Discharge Disposition: Home, Self-Care 01


Condition: Good





- Referral to Home Health


Primary Care Physician: 


PCP None








- Discharge Diagnosis/Problem(s)


(1) Atrial fibrillation with rapid ventricular response


SNOMED Code(s): 975199986407341


   ICD Code: I48.91 - UNSPECIFIED ATRIAL FIBRILLATION   Status: Acute   





(2) Ventral hernia without obstruction or gangrene


SNOMED Code(s): 034929996


   ICD Code: K43.9 - VENTRAL HERNIA WITHOUT OBSTRUCTION OR GANGRENE   Status: 

Acute   





- Patient Summary/Data


Hospital Course: 





Caden presented to the emergency room with his main concern being what he thought

was a symptomatic hernia in the epigastrium but also mentions some shortness of 

breath.  He was placed on the cardiac monitor in the emergency room and noted to

be in atrial fibrillation with a heart rate in the 160s and 170s.  He was given 

a bolus of diltiazem which did slow him down.  Labs were unremarkable.  He was 

admitted to the intensive care unit for management of his atrial fibrillation as

well as further evaluation of a suspected hernia.  We utilize the diltiazem 

infusion during the initial portion of the hospital stay.  Unfortunately he 

became hypotensive and this had to be stopped the evening after admission.  He 

had received a one-time dose of digoxin.  Rate control has been adequate since 

that time with heart rates in the 90s to 100s.  He has not reported any 

significant dyspnea or chest pain.  He was transitioned to oral medications this

morning and has tolerated them well.  Blood pressure has been in the 110's and 

his heart rate has been in the 80s and 90s.  Symptomatically he feels okay and 

is interested in going home at this time.  We did start him on rivaroxaban 

because of his elevated CHADS-VASC score and unclear duration of his atrial 

fibrillation.  He does have a cardiology follow-up scheduled in 1 month and 

consideration for outpatient cardioversion could be considered at that time.  

There is no obvious reason for his episode of atrial fibrillation with no 

evidence for ischemia or infection.  He did report that the episode started a 

couple of weeks ago after vigorous work during a very hot day and this may have 

been the stress on the heart that was the trigger.





Regarding the epigastric pain, the patient has a small to medium sized ventral 

hernia in the epigastric area.  This was reducible and not tender on the day of 

discharge.  At this point I think it is more important to get his atrial 

fibrillation under control since the hernia is not too bothersome.  He is aware 

that if he continues to have difficulty or has an increase in difficulty that he

could consider surgical consultation.  There is no urgent indication for surgery

at this time.





- Patient Instructions


Diet: Regular Diet as Tolerated


Activity: As Tolerated


Driving: May Drive Today


Showering/Bathing: May Shower


Other/Special Instructions: You were in the hospital for management of 

paroxysmal atrial fibrillation with a rapid ventricular response.  Your 

condition has been improving with the addition of diltiazem to the metoprolol.  

Your heart does remain in atrial fibrillation at this time.  Because of the 

unclear duration of the heart rhythm we are not able to perform electrical or 

chemical cardioversion because of the risk of stroke.  I recommend that we 

continue the combination of diltiazem and your prior to admission metoprolol to 

help control the rate.  To help reduce the risk of stroke I recommend that you 

start rivaroxaban (Xarelto) 20 mg daily.  You should follow-up with primary care

at the [facility] in the next week or 2 and then continue your appointment 

with the cardiologist in 1 month.





- Discharge Plan


*PRESCRIPTION DRUG MONITORING PROGRAM REVIEWED*: Not Applicable


*COPY OF PRESCRIPTION DRUG MONITORING REPORT IN PATIENT ASIF: Not Applicable


Prescriptions/Med Rec: 


dilTIAZem HCL [Diltiazem 24Hr ER] 120 mg PO DAILY #30 cap.sa.24h


Rivaroxaban [Xarelto] 20 mg PO DAILY #30 tablet


Home Medications: 


                                    Home Meds





Metoprolol Succinate [Toprol XL 100mg] 100 mg PO DAILY 06/19/21 [History]


Rivaroxaban [Xarelto] 20 mg PO DAILY #30 tablet 06/20/21 [Rx]


dilTIAZem HCL [Diltiazem 24Hr ER] 120 mg PO DAILY #30 cap.sa.24h 06/20/21 [Rx]








Oxygen Therapy Mode: Room Air


Patient Handouts:  Diltiazem Extended-Release Oral Capsules or Tablets, Atrial 

Fibrillation


Referrals: 


PCP,None [Primary Care Provider] -  (Follow-up with one of the providers at the 

[facility] in 1 to 2 weeks -follow-up hospital stay for paroxysmal atrial 

fibrillation with rapid ventricular response)





- Discharge Summary/Plan Comment


DC Time >30 min.: No





- Patient Data


Vitals - Most Recent: 


                                Last Vital Signs











Temp  36.5 C   06/20/21 08:00


 


Pulse  89   06/20/21 13:00


 


Resp  21 H  06/20/21 13:00


 


BP  105/79   06/20/21 13:00


 


Pulse Ox  94 L  06/20/21 13:00











Weight - Most Recent: 87.543 kg


I&O - Last 24 hours: 


                                 Intake & Output











 06/19/21 06/20/21 06/20/21





 22:59 06:59 14:59


 


Intake Total 360  240


 


Output Total 200  


 


Balance 160  240











Lab Results - Last 24 hrs: 


                         Laboratory Results - last 24 hr











  06/19/21 06/19/21 06/19/21 Range/Units





  13:56 13:56 15:28 


 


WBC  10.2    (4.5-11.0)  K/uL


 


RBC  5.16    (4.30-5.90)  M/uL


 


Hgb  15.8 H    (12.0-15.0)  g/dL


 


Hct  48.0    (40.0-54.0)  %


 


MCV  93    (80-98)  fL


 


MCH  31    (27-31)  pg


 


MCHC  33    (32-36)  %


 


Plt Count  171    (150-400)  K/uL


 


Neut % (Auto)  52.9    (36-66)  %


 


Lymph % (Auto)  33.4    (24-44)  %


 


Mono % (Auto)  11.8 H    (2-6)  %


 


Eos % (Auto)  1.3 L    (2-4)  %


 


Baso % (Auto)  0.6    (0-1)  %


 


Sodium   140   (140-148)  mmol/L


 


Potassium   4.6   (3.6-5.2)  mmol/L


 


Chloride   105   (100-108)  mmol/L


 


Carbon Dioxide   24   (21-32)  mmol/L


 


Anion Gap   11.3   (5.0-14.0)  mmol/L


 


BUN   33 H   (7-18)  mg/dL


 


Creatinine   1.2   (0.8-1.3)  mg/dL


 


Est Cr Clr Drug Dosing   64.26   mL/min


 


Estimated GFR (MDRD)   > 60   (>60)  


 


Glucose   81   ()  mg/dL


 


Calcium   8.7   (8.5-10.1)  mg/dL


 


Magnesium    2.0  (1.8-2.4)  mg/dL


 


Troponin I   < 0.017   (0.000-0.056)  ng/mL


 


TSH, Ultra Sensitive     (0.358-3.740)  uIU/mL














  06/19/21 Range/Units





  15:58 


 


WBC   (4.5-11.0)  K/uL


 


RBC   (4.30-5.90)  M/uL


 


Hgb   (12.0-15.0)  g/dL


 


Hct   (40.0-54.0)  %


 


MCV   (80-98)  fL


 


MCH   (27-31)  pg


 


MCHC   (32-36)  %


 


Plt Count   (150-400)  K/uL


 


Neut % (Auto)   (36-66)  %


 


Lymph % (Auto)   (24-44)  %


 


Mono % (Auto)   (2-6)  %


 


Eos % (Auto)   (2-4)  %


 


Baso % (Auto)   (0-1)  %


 


Sodium   (140-148)  mmol/L


 


Potassium   (3.6-5.2)  mmol/L


 


Chloride   (100-108)  mmol/L


 


Carbon Dioxide   (21-32)  mmol/L


 


Anion Gap   (5.0-14.0)  mmol/L


 


BUN   (7-18)  mg/dL


 


Creatinine   (0.8-1.3)  mg/dL


 


Est Cr Clr Drug Dosing   mL/min


 


Estimated GFR (MDRD)   (>60)  


 


Glucose   ()  mg/dL


 


Calcium   (8.5-10.1)  mg/dL


 


Magnesium   (1.8-2.4)  mg/dL


 


Troponin I   (0.000-0.056)  ng/mL


 


TSH, Ultra Sensitive  1.767  (0.358-3.740)  uIU/mL











Med Orders - Current: 


                               Current Medications





Acetaminophen (Acetaminophen 325 Mg Tab)  650 mg PO Q4H PRN


   PRN Reason: Pain (Mild 1-3)/fever


Diltiazem HCl (Diltiazem Ir 30 Mg Tab)  30 mg PO Q6HR CHRIS


   Last Admin: 06/20/21 09:31 Dose:  30 mg


   Documented by: 


Diltiazem HCl (Diltiazem 120 Mg Cap.Cd)  120 mg PO ONETIME ONE


   Stop: 06/20/21 14:31


Magnesium Hydroxide (Magnesium Hydroxide 400 Mg/5 Ml Susp 30 Ml Cup)  30 ml PO 

Q12H PRN


   PRN Reason: Constipation


Melatonin (Melatonin 3 Mg Tab)  9 mg PO BEDTIME PRN


   PRN Reason: Sleep


   Last Admin: 06/19/21 23:29 Dose:  9 mg


   Documented by: 


Metoprolol Succinate (Metoprolol Succinate 50 Mg Tab.Er)  100 mg PO DAILY CHRIS


   Last Admin: 06/20/21 08:12 Dose:  100 mg


   Documented by: 


Ondansetron HCl (Ondansetron 4 Mg/2 Ml Sdv)  4 mg IV Q6H PRN


   PRN Reason: Nausea/Vomiting


Ondansetron HCl (Ondansetron 4 Mg Tab.Dis)  4 mg PO Q6H PRN


   PRN Reason: Nausea able to take PO


Senna/Docusate Sodium (Docusate Sodium/Sennosides 50-8.6 Mg Tab)  1 tab PO BID 

PRN


   PRN Reason: Constipation


Sodium Chloride (Sodium Chloride 0.9% 10 Ml Syringe)  10 ml FLUSH ASDIRECTED PRN


   PRN Reason: Keep Vein Open


   Last Admin: 06/19/21 13:58 Dose:  10 ml


   Documented by: 





Discontinued Medications





Digoxin (Digoxin 500 Mcg/2 Ml Amp)  250 mcg IVPUSH ONETIME STA


   Stop: 06/19/21 21:31


   Last Admin: 06/19/21 21:50 Dose:  250 mcg


   Documented by: 


Diltiazem HCl (Diltiazem 25 Mg/5 Ml Sdv)  20 mg IVPUSH ONETIME ONE


   Stop: 06/19/21 13:53


   Last Admin: 06/19/21 13:58 Dose:  20 mg


   Documented by: 


Sodium Chloride (Normal Saline)  1,000 mls @ 125 mls/hr IV ASDIRECTED CHRIS


   Last Admin: 06/19/21 13:58 Dose:  125 mls/hr


   Documented by: 


Diltiazem HCl 100 mg/ Sodium (Chloride)  100 mls @ 5 mls/hr IV TITRATE CHRIS; 

Protocol


   Last Titration: 06/19/21 17:11 Dose:  10 mg/hr, 10 mls/hr


   Documented by: 


Lactated Ringer's (Ringers, Lactated)  1,000 mls @ 25 mls/hr IV ASDIRECTED CHRIS


   Stop: 06/20/21 10:00


Diltiazem HCl 100 mg/ Sodium (Chloride)  100 mls @ 5 mls/hr IV TITRATE CHRIS; 

Protocol


   Stop: 06/20/21 10:00


   Last Admin: 06/20/21 03:56 Dose:  5 mg/hr, 5 mls/hr


   Documented by:

## 2021-06-21 NOTE — CR
CHEST: Portable 6/19/2021 at 2:13 PM

 

CLINICAL HISTORY:Chest pain

 

COMPARISON:CT 2017

 

FINDINGS:  Heart is enlarged. Pulmonary vascularity is normal. There is blunting

of the right costophrenic angle which may represent small effusion. There is

mild generalized interstitial prominence. Some of this may be chronic.

 

IMPRESSION: Cardiomegaly

 

Mild interstitial prominence. Some of this may be chronic

 

Small right pleural effusion